# Patient Record
Sex: FEMALE | Race: WHITE | NOT HISPANIC OR LATINO | URBAN - METROPOLITAN AREA
[De-identification: names, ages, dates, MRNs, and addresses within clinical notes are randomized per-mention and may not be internally consistent; named-entity substitution may affect disease eponyms.]

---

## 2017-02-07 ENCOUNTER — INPATIENT (INPATIENT)
Facility: HOSPITAL | Age: 67
LOS: 3 days | Discharge: HOME HEALTH | DRG: 603 | End: 2017-02-11
Attending: INTERNAL MEDICINE | Admitting: INTERNAL MEDICINE
Payer: MEDICARE

## 2017-02-07 VITALS
DIASTOLIC BLOOD PRESSURE: 97 MMHG | TEMPERATURE: 98 F | WEIGHT: 130.07 LBS | RESPIRATION RATE: 20 BRPM | HEART RATE: 131 BPM | SYSTOLIC BLOOD PRESSURE: 141 MMHG | HEIGHT: 63 IN | OXYGEN SATURATION: 95 %

## 2017-02-07 DIAGNOSIS — L03.90 CELLULITIS, UNSPECIFIED: ICD-10-CM

## 2017-02-07 LAB
ACETONE SERPL-MCNC: ABNORMAL
ALBUMIN SERPL ELPH-MCNC: 3 G/DL — LOW (ref 3.5–5)
ALP SERPL-CCNC: 56 U/L — SIGNIFICANT CHANGE UP (ref 40–120)
ALT FLD-CCNC: 18 U/L DA — SIGNIFICANT CHANGE UP (ref 10–60)
ANION GAP SERPL CALC-SCNC: 9 MMOL/L — SIGNIFICANT CHANGE UP (ref 5–17)
APTT BLD: 31.5 SEC — SIGNIFICANT CHANGE UP (ref 27.5–37.4)
AST SERPL-CCNC: 14 U/L — SIGNIFICANT CHANGE UP (ref 10–40)
BASOPHILS # BLD AUTO: 0.1 K/UL — SIGNIFICANT CHANGE UP (ref 0–0.2)
BASOPHILS NFR BLD AUTO: 0.9 % — SIGNIFICANT CHANGE UP (ref 0–2)
BILIRUB SERPL-MCNC: 0.4 MG/DL — SIGNIFICANT CHANGE UP (ref 0.2–1.2)
BUN SERPL-MCNC: 18 MG/DL — SIGNIFICANT CHANGE UP (ref 7–18)
CALCIUM SERPL-MCNC: 9 MG/DL — SIGNIFICANT CHANGE UP (ref 8.4–10.5)
CHLORIDE SERPL-SCNC: 106 MMOL/L — SIGNIFICANT CHANGE UP (ref 96–108)
CO2 SERPL-SCNC: 25 MMOL/L — SIGNIFICANT CHANGE UP (ref 22–31)
CREAT SERPL-MCNC: 0.78 MG/DL — SIGNIFICANT CHANGE UP (ref 0.5–1.3)
EOSINOPHIL # BLD AUTO: 0.1 K/UL — SIGNIFICANT CHANGE UP (ref 0–0.5)
EOSINOPHIL NFR BLD AUTO: 1.4 % — SIGNIFICANT CHANGE UP (ref 0–6)
GLUCOSE SERPL-MCNC: 130 MG/DL — HIGH (ref 70–99)
HCT VFR BLD CALC: 41.6 % — SIGNIFICANT CHANGE UP (ref 34.5–45)
HGB BLD-MCNC: 13.1 G/DL — SIGNIFICANT CHANGE UP (ref 11.5–15.5)
INR BLD: 1.03 RATIO — SIGNIFICANT CHANGE UP (ref 0.88–1.16)
LACTATE SERPL-SCNC: 1.4 MMOL/L — SIGNIFICANT CHANGE UP (ref 0.7–2)
LYMPHOCYTES # BLD AUTO: 3.4 K/UL — HIGH (ref 1–3.3)
LYMPHOCYTES # BLD AUTO: 35.6 % — SIGNIFICANT CHANGE UP (ref 13–44)
MAGNESIUM SERPL-MCNC: 1.6 MG/DL — LOW (ref 1.8–2.4)
MCHC RBC-ENTMCNC: 28.9 PG — SIGNIFICANT CHANGE UP (ref 27–34)
MCHC RBC-ENTMCNC: 31.4 GM/DL — LOW (ref 32–36)
MCV RBC AUTO: 92 FL — SIGNIFICANT CHANGE UP (ref 80–100)
MONOCYTES # BLD AUTO: 1.1 K/UL — HIGH (ref 0–0.9)
MONOCYTES NFR BLD AUTO: 11.4 % — SIGNIFICANT CHANGE UP (ref 2–14)
NEUTROPHILS # BLD AUTO: 4.8 K/UL — SIGNIFICANT CHANGE UP (ref 1.8–7.4)
NEUTROPHILS NFR BLD AUTO: 50.6 % — SIGNIFICANT CHANGE UP (ref 43–77)
PLATELET # BLD AUTO: 274 K/UL — SIGNIFICANT CHANGE UP (ref 150–400)
POTASSIUM SERPL-MCNC: 3.8 MMOL/L — SIGNIFICANT CHANGE UP (ref 3.5–5.3)
POTASSIUM SERPL-SCNC: 3.8 MMOL/L — SIGNIFICANT CHANGE UP (ref 3.5–5.3)
PROT SERPL-MCNC: 7.3 G/DL — SIGNIFICANT CHANGE UP (ref 6–8.3)
PROTHROM AB SERPL-ACNC: 11.5 SEC — SIGNIFICANT CHANGE UP (ref 10–13.1)
RBC # BLD: 4.52 M/UL — SIGNIFICANT CHANGE UP (ref 3.8–5.2)
RBC # FLD: 14.7 % — HIGH (ref 10.3–14.5)
SODIUM SERPL-SCNC: 140 MMOL/L — SIGNIFICANT CHANGE UP (ref 135–145)
WBC # BLD: 9.5 K/UL — SIGNIFICANT CHANGE UP (ref 3.8–10.5)
WBC # FLD AUTO: 9.5 K/UL — SIGNIFICANT CHANGE UP (ref 3.8–10.5)

## 2017-02-07 PROCEDURE — 71010: CPT | Mod: 26

## 2017-02-07 PROCEDURE — 99285 EMERGENCY DEPT VISIT HI MDM: CPT

## 2017-02-07 PROCEDURE — 73110 X-RAY EXAM OF WRIST: CPT | Mod: 26,RT

## 2017-02-07 PROCEDURE — 73130 X-RAY EXAM OF HAND: CPT | Mod: 26,RT

## 2017-02-07 RX ORDER — SODIUM CHLORIDE 9 MG/ML
1000 INJECTION INTRAMUSCULAR; INTRAVENOUS; SUBCUTANEOUS
Qty: 0 | Refills: 0 | Status: DISCONTINUED | OUTPATIENT
Start: 2017-02-07 | End: 2017-02-08

## 2017-02-07 RX ORDER — PIPERACILLIN AND TAZOBACTAM 4; .5 G/20ML; G/20ML
3.38 INJECTION, POWDER, LYOPHILIZED, FOR SOLUTION INTRAVENOUS ONCE
Qty: 0 | Refills: 0 | Status: COMPLETED | OUTPATIENT
Start: 2017-02-07 | End: 2017-02-07

## 2017-02-07 RX ORDER — KETOROLAC TROMETHAMINE 30 MG/ML
30 SYRINGE (ML) INJECTION ONCE
Qty: 0 | Refills: 0 | Status: DISCONTINUED | OUTPATIENT
Start: 2017-02-07 | End: 2017-02-07

## 2017-02-07 RX ORDER — MAGNESIUM SULFATE 500 MG/ML
2 VIAL (ML) INJECTION ONCE
Qty: 0 | Refills: 0 | Status: COMPLETED | OUTPATIENT
Start: 2017-02-07 | End: 2017-02-07

## 2017-02-07 RX ADMIN — Medication 50 GRAM(S): at 22:44

## 2017-02-07 RX ADMIN — Medication 30 MILLIGRAM(S): at 19:29

## 2017-02-07 RX ADMIN — Medication 30 MILLIGRAM(S): at 18:14

## 2017-02-07 RX ADMIN — PIPERACILLIN AND TAZOBACTAM 200 GRAM(S): 4; .5 INJECTION, POWDER, LYOPHILIZED, FOR SOLUTION INTRAVENOUS at 18:14

## 2017-02-07 NOTE — ED PROVIDER NOTE - OBJECTIVE STATEMENT
65 y/o F pt w/ PMHx of Alzheimer disease, NIDDM (last dose Metformin 0930 today) , HLD, and HTN and PSHx of hernia surgery and  was BIB daughter and HHA to the ED for R hand swelling and redness today, as per daughter. The pt's HHA reports finding the pt in bed this morning w/ her R hand dangling between the bed and the wall. The pt's daughter denies fall or trauma.  Pt's daughter states that the pt is nonverbal at baseline secondary to Alzheimers. Pt's daughter denies fever, vomiting, diarrhea, blood in stool, hematuria, rash, or any other complaints. PMD: Dr. Carol Bay. [place]

## 2017-02-07 NOTE — ED PROVIDER NOTE - MEDICAL DECISION MAKING DETAILS
65 y/o F pt w/ Alzheimer's appears to have cellulitis of R hand. Will get Xray, give analgesics and Abx. Admission.

## 2017-02-07 NOTE — ED PROVIDER NOTE - CARE PLAN
Principal Discharge DX:	Cellulitis  Secondary Diagnosis:	Lymphangitis  Secondary Diagnosis:	Dehydration

## 2017-02-07 NOTE — ED PROVIDER NOTE - NS ED MD SCRIBE ATTENDING SCRIBE SECTIONS
PHYSICAL EXAM/REVIEW OF SYSTEMS/VITAL SIGNS( Pullset)/PAST MEDICAL/SURGICAL/SOCIAL HISTORY/DISPOSITION/HISTORY OF PRESENT ILLNESS

## 2017-02-07 NOTE — ED ADULT NURSE NOTE - OBJECTIVE STATEMENT
Presented to ED with complaints of "both hands swelling when patient woke up today." Patient is awake, nonverbal. Breathing easy on room air.

## 2017-02-07 NOTE — ED ADULT NURSE REASSESSMENT NOTE - NS ED NURSE REASSESS COMMENT FT1
received from ED pt awake, not in distress, speech not clear to understand, right forearm with swelling and redness, pt admitted for cellulitis, will cont. care. received from ED pt awake, not in distress, speech not clear to understand, HHA states pt  with advanced dementia ,right forearm with swelling and redness, pt admitted for cellulitis, will cont. care.IV site right AC g20 no swelling noted.

## 2017-02-07 NOTE — ED PROVIDER NOTE - UPPER EXTREMITY EXAM, RIGHT
R hand swelling, R index finger w/ erythema, able to flex fingers, tenderness to palpation to dorsal aspect of hand, redness tracking dorsally up to elbow, warm to touch

## 2017-02-08 DIAGNOSIS — G30.9 ALZHEIMER'S DISEASE, UNSPECIFIED: ICD-10-CM

## 2017-02-08 DIAGNOSIS — L03.90 CELLULITIS, UNSPECIFIED: ICD-10-CM

## 2017-02-08 DIAGNOSIS — I10 ESSENTIAL (PRIMARY) HYPERTENSION: ICD-10-CM

## 2017-02-08 DIAGNOSIS — Z41.8 ENCOUNTER FOR OTHER PROCEDURES FOR PURPOSES OTHER THAN REMEDYING HEALTH STATE: ICD-10-CM

## 2017-02-08 DIAGNOSIS — E78.5 HYPERLIPIDEMIA, UNSPECIFIED: ICD-10-CM

## 2017-02-08 DIAGNOSIS — R56.9 UNSPECIFIED CONVULSIONS: ICD-10-CM

## 2017-02-08 DIAGNOSIS — E11.9 TYPE 2 DIABETES MELLITUS WITHOUT COMPLICATIONS: ICD-10-CM

## 2017-02-08 DIAGNOSIS — S69.90XA UNSPECIFIED INJURY OF UNSPECIFIED WRIST, HAND AND FINGER(S), INITIAL ENCOUNTER: ICD-10-CM

## 2017-02-08 LAB
ANION GAP SERPL CALC-SCNC: 9 MMOL/L — SIGNIFICANT CHANGE UP (ref 5–17)
ANTI-RIBONUCLEAR PROTEIN: <0.2 AI — SIGNIFICANT CHANGE UP
BUN SERPL-MCNC: 15 MG/DL — SIGNIFICANT CHANGE UP (ref 7–18)
CALCIUM SERPL-MCNC: 8.6 MG/DL — SIGNIFICANT CHANGE UP (ref 8.4–10.5)
CHLORIDE SERPL-SCNC: 109 MMOL/L — HIGH (ref 96–108)
CHOLEST SERPL-MCNC: 222 MG/DL — HIGH (ref 10–199)
CK SERPL-CCNC: 167 U/L — SIGNIFICANT CHANGE UP (ref 21–215)
CO2 SERPL-SCNC: 27 MMOL/L — SIGNIFICANT CHANGE UP (ref 22–31)
CREAT SERPL-MCNC: 0.77 MG/DL — SIGNIFICANT CHANGE UP (ref 0.5–1.3)
CRP SERPL-MCNC: 0.78 MG/DL — HIGH (ref 0–0.4)
DSDNA AB FLD-ACNC: <0.2 AI — SIGNIFICANT CHANGE UP
ENA SS-A AB FLD IA-ACNC: <0.2 AI — SIGNIFICANT CHANGE UP
ERYTHROCYTE [SEDIMENTATION RATE] IN BLOOD: 28 MM/HR — HIGH (ref 0–20)
FOLATE SERPL-MCNC: 17.8 NG/ML — SIGNIFICANT CHANGE UP (ref 4.8–24.2)
GLUCOSE SERPL-MCNC: 138 MG/DL — HIGH (ref 70–99)
HBA1C BLD-MCNC: 6.5 % — HIGH (ref 4–5.6)
HCT VFR BLD CALC: 40.4 % — SIGNIFICANT CHANGE UP (ref 34.5–45)
HDLC SERPL-MCNC: 46 MG/DL — SIGNIFICANT CHANGE UP (ref 40–125)
HGB BLD-MCNC: 13 G/DL — SIGNIFICANT CHANGE UP (ref 11.5–15.5)
LACTATE SERPL-SCNC: 1.4 MMOL/L — SIGNIFICANT CHANGE UP (ref 0.7–2)
LIPID PNL WITH DIRECT LDL SERPL: 139 MG/DL — SIGNIFICANT CHANGE UP
MAGNESIUM SERPL-MCNC: 2.4 MG/DL — SIGNIFICANT CHANGE UP (ref 1.8–2.4)
MCHC RBC-ENTMCNC: 30 PG — SIGNIFICANT CHANGE UP (ref 27–34)
MCHC RBC-ENTMCNC: 32.2 GM/DL — SIGNIFICANT CHANGE UP (ref 32–36)
MCV RBC AUTO: 93.1 FL — SIGNIFICANT CHANGE UP (ref 80–100)
PHOSPHATE SERPL-MCNC: 3.2 MG/DL — SIGNIFICANT CHANGE UP (ref 2.5–4.5)
PLATELET # BLD AUTO: 258 K/UL — SIGNIFICANT CHANGE UP (ref 150–400)
POTASSIUM SERPL-MCNC: 4.2 MMOL/L — SIGNIFICANT CHANGE UP (ref 3.5–5.3)
POTASSIUM SERPL-SCNC: 4.2 MMOL/L — SIGNIFICANT CHANGE UP (ref 3.5–5.3)
RBC # BLD: 4.34 M/UL — SIGNIFICANT CHANGE UP (ref 3.8–5.2)
RBC # FLD: 14.9 % — HIGH (ref 10.3–14.5)
SODIUM SERPL-SCNC: 145 MMOL/L — SIGNIFICANT CHANGE UP (ref 135–145)
TOTAL CHOLESTEROL/HDL RATIO MEASUREMENT: 4.8 RATIO — SIGNIFICANT CHANGE UP (ref 3.3–7.1)
TRIGL SERPL-MCNC: 187 MG/DL — HIGH (ref 10–149)
TSH SERPL-MCNC: 0.8 UU/ML — SIGNIFICANT CHANGE UP (ref 0.34–4.82)
VIT B12 SERPL-MCNC: 711 PG/ML — SIGNIFICANT CHANGE UP (ref 243–894)
WBC # BLD: 8 K/UL — SIGNIFICANT CHANGE UP (ref 3.8–10.5)
WBC # FLD AUTO: 8 K/UL — SIGNIFICANT CHANGE UP (ref 3.8–10.5)

## 2017-02-08 PROCEDURE — 99223 1ST HOSP IP/OBS HIGH 75: CPT | Mod: AI,GC

## 2017-02-08 RX ORDER — ENOXAPARIN SODIUM 100 MG/ML
40 INJECTION SUBCUTANEOUS DAILY
Qty: 0 | Refills: 0 | Status: DISCONTINUED | OUTPATIENT
Start: 2017-02-08 | End: 2017-02-11

## 2017-02-08 RX ORDER — AMLODIPINE BESYLATE 2.5 MG/1
5 TABLET ORAL DAILY
Qty: 0 | Refills: 0 | Status: DISCONTINUED | OUTPATIENT
Start: 2017-02-08 | End: 2017-02-11

## 2017-02-08 RX ORDER — SODIUM CHLORIDE 9 MG/ML
1000 INJECTION, SOLUTION INTRAVENOUS
Qty: 0 | Refills: 0 | Status: DISCONTINUED | OUTPATIENT
Start: 2017-02-08 | End: 2017-02-11

## 2017-02-08 RX ORDER — MULTIVIT-MIN/FERROUS GLUCONATE 9 MG/15 ML
1 LIQUID (ML) ORAL DAILY
Qty: 0 | Refills: 0 | Status: DISCONTINUED | OUTPATIENT
Start: 2017-02-08 | End: 2017-02-11

## 2017-02-08 RX ORDER — MEMANTINE HYDROCHLORIDE 10 MG/1
10 TABLET ORAL
Qty: 0 | Refills: 0 | Status: DISCONTINUED | OUTPATIENT
Start: 2017-02-08 | End: 2017-02-11

## 2017-02-08 RX ORDER — VALPROIC ACID (AS SODIUM SALT) 250 MG/5ML
500 SOLUTION, ORAL ORAL
Qty: 0 | Refills: 0 | Status: DISCONTINUED | OUTPATIENT
Start: 2017-02-08 | End: 2017-02-08

## 2017-02-08 RX ORDER — DEXTROSE 50 % IN WATER 50 %
12.5 SYRINGE (ML) INTRAVENOUS ONCE
Qty: 0 | Refills: 0 | Status: DISCONTINUED | OUTPATIENT
Start: 2017-02-08 | End: 2017-02-11

## 2017-02-08 RX ORDER — PIOGLITAZONE HYDROCHLORIDE 15 MG/1
15 TABLET ORAL DAILY
Qty: 0 | Refills: 0 | Status: DISCONTINUED | OUTPATIENT
Start: 2017-02-08 | End: 2017-02-11

## 2017-02-08 RX ORDER — GLUCAGON INJECTION, SOLUTION 0.5 MG/.1ML
1 INJECTION, SOLUTION SUBCUTANEOUS ONCE
Qty: 0 | Refills: 0 | Status: DISCONTINUED | OUTPATIENT
Start: 2017-02-08 | End: 2017-02-11

## 2017-02-08 RX ORDER — INSULIN LISPRO 100/ML
VIAL (ML) SUBCUTANEOUS
Qty: 0 | Refills: 0 | Status: DISCONTINUED | OUTPATIENT
Start: 2017-02-08 | End: 2017-02-11

## 2017-02-08 RX ORDER — DIVALPROEX SODIUM 500 MG/1
500 TABLET, DELAYED RELEASE ORAL
Qty: 0 | Refills: 0 | Status: DISCONTINUED | OUTPATIENT
Start: 2017-02-08 | End: 2017-02-09

## 2017-02-08 RX ORDER — AMPICILLIN SODIUM AND SULBACTAM SODIUM 250; 125 MG/ML; MG/ML
3 INJECTION, POWDER, FOR SUSPENSION INTRAMUSCULAR; INTRAVENOUS ONCE
Qty: 0 | Refills: 0 | Status: COMPLETED | OUTPATIENT
Start: 2017-02-08 | End: 2017-02-08

## 2017-02-08 RX ORDER — METFORMIN HYDROCHLORIDE 850 MG/1
500 TABLET ORAL
Qty: 0 | Refills: 0 | Status: DISCONTINUED | OUTPATIENT
Start: 2017-02-08 | End: 2017-02-09

## 2017-02-08 RX ORDER — DEXTROSE 50 % IN WATER 50 %
1 SYRINGE (ML) INTRAVENOUS ONCE
Qty: 0 | Refills: 0 | Status: DISCONTINUED | OUTPATIENT
Start: 2017-02-08 | End: 2017-02-11

## 2017-02-08 RX ORDER — AMPICILLIN SODIUM AND SULBACTAM SODIUM 250; 125 MG/ML; MG/ML
3 INJECTION, POWDER, FOR SUSPENSION INTRAMUSCULAR; INTRAVENOUS EVERY 6 HOURS
Qty: 0 | Refills: 0 | Status: DISCONTINUED | OUTPATIENT
Start: 2017-02-08 | End: 2017-02-10

## 2017-02-08 RX ORDER — PANTOPRAZOLE SODIUM 20 MG/1
40 TABLET, DELAYED RELEASE ORAL
Qty: 0 | Refills: 0 | Status: DISCONTINUED | OUTPATIENT
Start: 2017-02-08 | End: 2017-02-11

## 2017-02-08 RX ORDER — AMPICILLIN SODIUM AND SULBACTAM SODIUM 250; 125 MG/ML; MG/ML
INJECTION, POWDER, FOR SUSPENSION INTRAMUSCULAR; INTRAVENOUS
Qty: 0 | Refills: 0 | Status: DISCONTINUED | OUTPATIENT
Start: 2017-02-08 | End: 2017-02-10

## 2017-02-08 RX ORDER — DEXTROSE 50 % IN WATER 50 %
25 SYRINGE (ML) INTRAVENOUS ONCE
Qty: 0 | Refills: 0 | Status: DISCONTINUED | OUTPATIENT
Start: 2017-02-08 | End: 2017-02-11

## 2017-02-08 RX ORDER — MULTIVIT WITH MIN/MFOLATE/K2 340-15/3 G
300 POWDER (GRAM) ORAL
Qty: 0 | Refills: 0 | Status: DISCONTINUED | OUTPATIENT
Start: 2017-02-08 | End: 2017-02-11

## 2017-02-08 RX ORDER — ASPIRIN/CALCIUM CARB/MAGNESIUM 324 MG
81 TABLET ORAL DAILY
Qty: 0 | Refills: 0 | Status: DISCONTINUED | OUTPATIENT
Start: 2017-02-08 | End: 2017-02-11

## 2017-02-08 RX ORDER — ACETAMINOPHEN 500 MG
650 TABLET ORAL EVERY 6 HOURS
Qty: 0 | Refills: 0 | Status: DISCONTINUED | OUTPATIENT
Start: 2017-02-08 | End: 2017-02-11

## 2017-02-08 RX ADMIN — AMPICILLIN SODIUM AND SULBACTAM SODIUM 200 GRAM(S): 250; 125 INJECTION, POWDER, FOR SUSPENSION INTRAMUSCULAR; INTRAVENOUS at 13:45

## 2017-02-08 RX ADMIN — Medication 81 MILLIGRAM(S): at 12:27

## 2017-02-08 RX ADMIN — SODIUM CHLORIDE 150 MILLILITER(S): 9 INJECTION INTRAMUSCULAR; INTRAVENOUS; SUBCUTANEOUS at 00:32

## 2017-02-08 RX ADMIN — Medication 2: at 12:27

## 2017-02-08 RX ADMIN — MEMANTINE HYDROCHLORIDE 10 MILLIGRAM(S): 10 TABLET ORAL at 06:19

## 2017-02-08 RX ADMIN — DIVALPROEX SODIUM 500 MILLIGRAM(S): 500 TABLET, DELAYED RELEASE ORAL at 06:19

## 2017-02-08 RX ADMIN — DIVALPROEX SODIUM 500 MILLIGRAM(S): 500 TABLET, DELAYED RELEASE ORAL at 17:44

## 2017-02-08 RX ADMIN — ENOXAPARIN SODIUM 40 MILLIGRAM(S): 100 INJECTION SUBCUTANEOUS at 12:27

## 2017-02-08 RX ADMIN — METFORMIN HYDROCHLORIDE 500 MILLIGRAM(S): 850 TABLET ORAL at 09:06

## 2017-02-08 RX ADMIN — AMPICILLIN SODIUM AND SULBACTAM SODIUM 200 GRAM(S): 250; 125 INJECTION, POWDER, FOR SUSPENSION INTRAMUSCULAR; INTRAVENOUS at 17:44

## 2017-02-08 RX ADMIN — SODIUM CHLORIDE 150 MILLILITER(S): 9 INJECTION INTRAMUSCULAR; INTRAVENOUS; SUBCUTANEOUS at 06:32

## 2017-02-08 RX ADMIN — AMLODIPINE BESYLATE 5 MILLIGRAM(S): 2.5 TABLET ORAL at 06:19

## 2017-02-08 RX ADMIN — PIOGLITAZONE HYDROCHLORIDE 15 MILLIGRAM(S): 15 TABLET ORAL at 12:27

## 2017-02-08 RX ADMIN — Medication 1 TABLET(S): at 12:27

## 2017-02-08 RX ADMIN — MEMANTINE HYDROCHLORIDE 10 MILLIGRAM(S): 10 TABLET ORAL at 17:44

## 2017-02-08 RX ADMIN — METFORMIN HYDROCHLORIDE 500 MILLIGRAM(S): 850 TABLET ORAL at 17:44

## 2017-02-08 RX ADMIN — AMPICILLIN SODIUM AND SULBACTAM SODIUM 200 GRAM(S): 250; 125 INJECTION, POWDER, FOR SUSPENSION INTRAMUSCULAR; INTRAVENOUS at 09:06

## 2017-02-08 NOTE — PHYSICAL THERAPY INITIAL EVALUATION ADULT - RANGE OF MOTION EXAMINATION, REHAB EVAL
Left LE ROM was WFL (within functional limits)/Left UE ROM was WFL (within functional limits)/Right LE ROM was WFL (within functional limits)/Right UE ROM was WFL (within functional limits)

## 2017-02-08 NOTE — H&P ADULT. - RS GEN PE MLT RESP DETAILS PC
respirations non-labored/breath sounds equal/good air movement/clear to auscultation bilaterally/airway patent/normal

## 2017-02-08 NOTE — H&P ADULT. - ASSESSMENT
67 y/o F pt w/ PMHx of Alzheimer disease, NIDDM (last dose Metformin 0930 today) , HLD, and HTN and PSHx of hernia surgery and  was BIB daughter and HHA to the ED for R hand swelling and redness today. 67 y/o F pt w/ PMHx of Alzheimer disease, seizure, osteoporosis, NIDDM (last dose Metformin 09 today) , HLD, and HTN and PSHx of hernia surgery and  was BIB daughter and HHA to the ED for R hand swelling and redness today.

## 2017-02-08 NOTE — PHYSICAL THERAPY INITIAL EVALUATION ADULT - FOLLOWS COMMANDS/ANSWERS QUESTIONS, REHAB EVAL
50% of the time/speech unintelligible/unable to answer questions/requires guidance and verbal/manual cues

## 2017-02-08 NOTE — H&P ADULT. - HISTORY OF PRESENT ILLNESS
65 y/o F pt w/ PMHx of Alzheimer disease, NIDDM (last dose Metformin 0930 today) , HLD, and HTN and PSHx of hernia surgery and  was BIB daughter and HHA to the ED for R hand swelling and redness today, as per daughter. The pt's HHA reports finding the pt in bed this morning w/ her R hand dangling between the bed and the wall. The pt's daughter denies fall or trauma.  Pt's daughter states that the pt is nonverbal at baseline secondary to Alzheimers. Pt's daughter denies fever, vomiting, diarrhea, blood in stool, hematuria, rash, or any other complaints. PMD: Dr. Carol Bay. 65 y/o F pt w/ PMHx of Alzheimer disease, NIDDM (last dose Metformin 0930 today) , HLD, and HTN and PSHx of hernia surgery and  was BIB daughter and HHA to the ED for R hand swelling and redness today. Pt can only mumble and can not answer question and do not follow command and history obtained from daughter and friend. The pt's HHA reports finding the pt in bed this morning w/ her R hand dangling between the bed and the wall. At baseline, pt can not take care of herself at all and need to be prompted to sit or walk. The pt's daughter denies fever, chills, ulcer or wound in the area before, fall or trauma.     In ED, pt vitals were stable. 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  And mild radiocarpal and lateral distal carpal osteoarthritis. There is no fracture or acute malalignment or focal osseous destruction or demineralization 67 y/o F pt walk independently when prompted with  HHA w/ PMHx of Alzheimer disease,  seizure, osteoporosis, NIDDM (last dose Metformin 0930 today) , HLD, and HTN and PSHx of hernia surgery and  was BIB daughter and HHA to the ED for R hand swelling and redness today. Pt can only mumble and can not answer question and do not follow command and history obtained from daughter and friend. The pt's HHA reports finding the pt in bed this morning w/ her R hand dangling between the bed and the wall. At baseline, pt can not take care of herself at all and need to be prompted to sit or walk. The pt's daughter denies fever, chills, ulcer or wound in the area before, fall or trauma.     In ED, pt vitals were stable. 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  And mild radiocarpal and lateral distal carpal osteoarthritis. There is no fracture or acute malalignment or focal osseous destruction or demineralization 67 y/o F pt walk independently when prompted with  HHA w/ PMHx of Alzheimer disease,  seizure, osteoporosis, NIDDM (last dose Metformin 0930 today) , HLD, and HTN and PSHx of hernia surgery and  was BIB daughter and HHA to the ED for R hand swelling and redness today. Pt can only mumble and can not answer question and do not follow command and history obtained from daughter and friend. The pt's HHA reports finding the pt in bed this morning w/ her R hand dangling between the bed and the wall. At baseline, pt can not take care of herself at all and need to be prompted to sit or walk. The pt's daughter denies fever, chills, ulcer or wound in the area before, fall or trauma.     In ED, pt vitals were stable. 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  And mild radiocarpal and lateral distal carpal osteoarthritis. There is no fracture or acute malalignment or focal osseous destruction or demineralization  On PE, moderate tenderness with bruises on fingers. No ulcer or wound.

## 2017-02-08 NOTE — PHYSICAL THERAPY INITIAL EVALUATION ADULT - MANUAL MUSCLE TESTING RESULTS, REHAB EVAL
unable to assess MMT secondary to pt. cognitive impairment, B UE and B LE at least 3-/5 as observed through performance of functional activities

## 2017-02-08 NOTE — PHYSICAL THERAPY INITIAL EVALUATION ADULT - GENERAL OBSERVATIONS, REHAB EVAL
Consult received, chart reviewed. Patient received supine in bed, NAD, A+O x 0. Patient agreed to EVALUATION from Physical Therapist.

## 2017-02-08 NOTE — PHYSICAL THERAPY INITIAL EVALUATION ADULT - LEVEL OF INDEPENDENCE: STAIR NEGOTIATION, REHAB EVAL
stairs not assessed at this time, will follow up with family if pt. requires stairs to enter home environment

## 2017-02-08 NOTE — H&P ADULT. - PROBLEM SELECTOR PLAN 1
Hand XR with only IP arthritis and no acute injury with mild tenderness and bruises on PE.  NO WBC, no ulcer or site of entry wound, no temp. Low suspicion for cellulitis. No ABx needed.  Pain management. PT consult. Hand XR with only IP arthritis and no acute injury with mild tenderness and bruises on PE. NO WBC, no ulcer or site of entry wound, no temp.   c/w Unasyn for now. f/u BCx, lactate.  Pain management. PT consult. Hand XR with only IP arthritis and no acute injury with mild tenderness and bruises on PE. NO WBC, no ulcer or site of entry wound, no temp.   c/w Unasyn for now. f/u BCx, lactate.  Pain management.

## 2017-02-08 NOTE — H&P ADULT. - ATTENDING COMMENTS
patient seen and examined this morning; Spoke with PGY 1 MAR Dr. Potts earlier today.    67 y/o F pt walk independently when prompted with  HHA w/ PMHx of Alzheimer disease,  seizure, osteoporosis, NIDDM (last dose Metformin 0930 today) , HLD, and HTN and PSHx of hernia surgery and  was BIB daughter and HHA to the ED for R hand swelling and redness today. Pt can only mumble and can not answer question and do not follow command and history obtained from daughter and friend. The pt's HHA reports finding the pt in bed this morning w/ her R hand dangling between the bed and the wall. At baseline, pt can not take care of herself at all and need to be prompted to sit or walk. The pt's daughter denies fever, chills, ulcer or wound in the area before, fall or trauma.       In ED, pt vitals were stable. 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  And mild radiocarpal and lateral distal carpal osteoarthritis. There is no fracture or acute malalignment or focal osseous destruction or demineralization    On PE, moderate tenderness with bruises on fingers. No ulcer or wound.   There is cyanosis of the hand and warmth;    Plan  Admit to medicine; Unasyn IV  vasculitis/ Connective tissue disease w/u: ESR, CRP, PK, ANCA, Lupus a/c  CT of Rt. UE; If abnormal will get hand surgery patient seen and examined this morning; Spoke with PGY 1 MAR Dr. Potts earlier today.    65 y/o F pt walk independently when prompted with  HHA w/ PMHx of Alzheimer disease,  seizure, osteoporosis, NIDDM (last dose Metformin 0930 today) , HLD, and HTN and PSHx of hernia surgery and  was BIB daughter and HHA to the ED for R hand swelling and redness today. Pt can only mumble and can not answer question and do not follow command and history obtained from daughter and friend. The pt's HHA reports finding the pt in bed this morning w/ her R hand dangling between the bed and the wall. At baseline, pt can not take care of herself at all and need to be prompted to sit or walk. The pt's daughter denies fever, chills, ulcer or wound in the area before, fall or trauma.       In ED, pt vitals were stable. 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  And mild radiocarpal and lateral distal carpal osteoarthritis. There is no fracture or acute malalignment or focal osseous destruction or demineralization    Vitals: 143/64  HR 66  Temp 98.8F RR 15  SaO2 98%RA    On PE, moderate tenderness with bruises on fingers. No ulcer or wound.   There is cyanosis of the hand and fingers especially second digit on palmar aspect and warmth;    labs: CBC, BP WNL;   CXR: No active chest disease  X ray right hand: Osteoarthritis of DIP jts    Plan  Admit to medicine; Unasyn IV; OOB to chair  vasculitis/ Connective tissue disease w/u: ESR, CRP, PK, ANCA, Lupus a/c  CT of Rt. UE; If abnormal will get hand surgery patient seen and examined this morning; Spoke with PGY 1 DAYNA Padilla earlier today.    65 y/o F pt walk independently when prompted with  HHA w/ PMHx of Alzheimer disease,  seizure, osteoporosis, NIDDM (last dose Metformin 0930 today) , HLD, and HTN and PSHx of hernia surgery and  was BIB daughter and HHA to the ED for R hand swelling and redness today. Pt can only mumble and can not answer question and do not follow command and history obtained from daughter and friend. The pt's HHA reports finding the pt in bed this morning w/ her R hand dangling between the bed and the wall. At baseline, pt can not take care of herself at all and need to be prompted to sit or walk. The pt's daughter denies fever, chills, ulcer or wound in the area before, fall or trauma.       In ED, pt vitals were stable. 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  And mild radiocarpal and lateral distal carpal osteoarthritis. There is no fracture or acute malalignment or focal osseous destruction or demineralization    Vitals: 143/64  HR 66  Temp 98.8F RR 15  SaO2 98%RA    On PE, moderate tenderness with bruises on fingers. No ulcer or wound.   There is cyanosis of the hand and fingers especially second digit on palmar aspect and warmth;    labs: CBC, BP WNL;  ;   CXR: No active chest disease  X ray right hand: Osteoarthritis of DIP jts    D/D:  Cellulitis versus Flare of Osteoarthritis/ Connective tissue disorder  Advanced Dementia    Plan  Admit to medicine; Unasyn IV; OOB to chair  vasculitis/ Connective tissue disease w/u: ESR, CRP, PK, ANCA, Lupus a/c  CT of Rt. UE; If abnormal will get hand surgery    d/w PGY1 DAYNA padilla earlier today;  d/w Floor PGY 1Dr. Juan/ Dr. mckinley PGY2.

## 2017-02-08 NOTE — H&P ADULT. - PROBLEM SELECTOR PLAN 3
continue metformin and pioglitazone home dose.   Accucheck. continue metformin and pioglitazone home dose.   Accucheck. HSS low scale.

## 2017-02-08 NOTE — PHYSICAL THERAPY INITIAL EVALUATION ADULT - LIVES WITH, PROFILE
Pt. unable to provide social history. As per H&P, at baseline, pt. was independent when prompted in ambulation and sitting. Pt. cannot take care of herself and has 24/7 HHA

## 2017-02-08 NOTE — PHYSICAL THERAPY INITIAL EVALUATION ADULT - CRITERIA FOR SKILLED THERAPEUTIC INTERVENTIONS
functional limitations in following categories/risk reduction/prevention/predicted duration of therapy intervention/rehab potential/therapy frequency/anticipated discharge recommendation/impairments found

## 2017-02-09 LAB
24R-OH-CALCIDIOL SERPL-MCNC: 12.3 NG/ML — LOW (ref 30–100)
ANA PAT FLD IF-IMP: ABNORMAL
ANA TITR SER: ABNORMAL
ANION GAP SERPL CALC-SCNC: 8 MMOL/L — SIGNIFICANT CHANGE UP (ref 5–17)
AUTO DIFF PNL BLD: NEGATIVE — SIGNIFICANT CHANGE UP
BUN SERPL-MCNC: 17 MG/DL — SIGNIFICANT CHANGE UP (ref 7–18)
C-ANCA SER-ACNC: NEGATIVE — SIGNIFICANT CHANGE UP
CALCIUM SERPL-MCNC: 8.1 MG/DL — LOW (ref 8.4–10.5)
CCP IGG SERPL-ACNC: <8 UNITS — SIGNIFICANT CHANGE UP (ref 0–19)
CHLORIDE SERPL-SCNC: 109 MMOL/L — HIGH (ref 96–108)
CO2 SERPL-SCNC: 28 MMOL/L — SIGNIFICANT CHANGE UP (ref 22–31)
CREAT SERPL-MCNC: 0.68 MG/DL — SIGNIFICANT CHANGE UP (ref 0.5–1.3)
DRVVT SCREEN TO CONFIRM RATIO: SIGNIFICANT CHANGE UP
DSDNA AB SER-ACNC: 47 IU/ML — HIGH
GLUCOSE SERPL-MCNC: 125 MG/DL — HIGH (ref 70–99)
LA NT DPL PPP QL: 27.9 SEC — SIGNIFICANT CHANGE UP
MAGNESIUM SERPL-MCNC: 2 MG/DL — SIGNIFICANT CHANGE UP (ref 1.8–2.4)
NORMALIZED SCT PPP-RTO: 1.03 RATIO — SIGNIFICANT CHANGE UP (ref 0–1.16)
NORMALIZED SCT PPP-RTO: SIGNIFICANT CHANGE UP
P-ANCA SER-ACNC: NEGATIVE — SIGNIFICANT CHANGE UP
POTASSIUM SERPL-MCNC: 3.4 MMOL/L — LOW (ref 3.5–5.3)
POTASSIUM SERPL-SCNC: 3.4 MMOL/L — LOW (ref 3.5–5.3)
RF+CCP IGG SER-IMP: NEGATIVE — SIGNIFICANT CHANGE UP
SODIUM SERPL-SCNC: 145 MMOL/L — SIGNIFICANT CHANGE UP (ref 135–145)

## 2017-02-09 PROCEDURE — 99232 SBSQ HOSP IP/OBS MODERATE 35: CPT | Mod: GC

## 2017-02-09 PROCEDURE — 73201 CT UPPER EXTREMITY W/DYE: CPT | Mod: 26,RT

## 2017-02-09 RX ORDER — ERGOCALCIFEROL 1.25 MG/1
50000 CAPSULE ORAL
Qty: 0 | Refills: 0 | Status: DISCONTINUED | OUTPATIENT
Start: 2017-02-09 | End: 2017-02-11

## 2017-02-09 RX ORDER — VALPROIC ACID (AS SODIUM SALT) 250 MG/5ML
500 SOLUTION, ORAL ORAL
Qty: 0 | Refills: 0 | Status: DISCONTINUED | OUTPATIENT
Start: 2017-02-09 | End: 2017-02-09

## 2017-02-09 RX ORDER — DIVALPROEX SODIUM 500 MG/1
500 TABLET, DELAYED RELEASE ORAL
Qty: 0 | Refills: 0 | Status: DISCONTINUED | OUTPATIENT
Start: 2017-02-09 | End: 2017-02-11

## 2017-02-09 RX ORDER — POTASSIUM CHLORIDE 20 MEQ
40 PACKET (EA) ORAL ONCE
Qty: 0 | Refills: 0 | Status: DISCONTINUED | OUTPATIENT
Start: 2017-02-09 | End: 2017-02-09

## 2017-02-09 RX ORDER — SODIUM CHLORIDE 9 MG/ML
1000 INJECTION, SOLUTION INTRAVENOUS
Qty: 0 | Refills: 0 | Status: DISCONTINUED | OUTPATIENT
Start: 2017-02-09 | End: 2017-02-11

## 2017-02-09 RX ORDER — POTASSIUM CHLORIDE 20 MEQ
40 PACKET (EA) ORAL ONCE
Qty: 0 | Refills: 0 | Status: COMPLETED | OUTPATIENT
Start: 2017-02-09 | End: 2017-02-09

## 2017-02-09 RX ORDER — SODIUM CHLORIDE 9 MG/ML
1000 INJECTION, SOLUTION INTRAVENOUS
Qty: 0 | Refills: 0 | Status: DISCONTINUED | OUTPATIENT
Start: 2017-02-09 | End: 2017-02-09

## 2017-02-09 RX ADMIN — Medication 40 MILLIEQUIVALENT(S): at 12:45

## 2017-02-09 RX ADMIN — ERGOCALCIFEROL 50000 UNIT(S): 1.25 CAPSULE ORAL at 17:24

## 2017-02-09 RX ADMIN — Medication 1 TABLET(S): at 12:45

## 2017-02-09 RX ADMIN — AMPICILLIN SODIUM AND SULBACTAM SODIUM 200 GRAM(S): 250; 125 INJECTION, POWDER, FOR SUSPENSION INTRAMUSCULAR; INTRAVENOUS at 12:45

## 2017-02-09 RX ADMIN — AMPICILLIN SODIUM AND SULBACTAM SODIUM 200 GRAM(S): 250; 125 INJECTION, POWDER, FOR SUSPENSION INTRAMUSCULAR; INTRAVENOUS at 00:50

## 2017-02-09 RX ADMIN — Medication 81 MILLIGRAM(S): at 12:45

## 2017-02-09 RX ADMIN — MEMANTINE HYDROCHLORIDE 10 MILLIGRAM(S): 10 TABLET ORAL at 17:24

## 2017-02-09 RX ADMIN — Medication 1 MILLIGRAM(S): at 13:24

## 2017-02-09 RX ADMIN — AMPICILLIN SODIUM AND SULBACTAM SODIUM 200 GRAM(S): 250; 125 INJECTION, POWDER, FOR SUSPENSION INTRAMUSCULAR; INTRAVENOUS at 05:40

## 2017-02-09 RX ADMIN — PIOGLITAZONE HYDROCHLORIDE 15 MILLIGRAM(S): 15 TABLET ORAL at 12:45

## 2017-02-09 RX ADMIN — METFORMIN HYDROCHLORIDE 500 MILLIGRAM(S): 850 TABLET ORAL at 08:29

## 2017-02-09 RX ADMIN — AMPICILLIN SODIUM AND SULBACTAM SODIUM 200 GRAM(S): 250; 125 INJECTION, POWDER, FOR SUSPENSION INTRAMUSCULAR; INTRAVENOUS at 17:24

## 2017-02-09 RX ADMIN — MEMANTINE HYDROCHLORIDE 10 MILLIGRAM(S): 10 TABLET ORAL at 05:38

## 2017-02-09 RX ADMIN — DIVALPROEX SODIUM 500 MILLIGRAM(S): 500 TABLET, DELAYED RELEASE ORAL at 05:38

## 2017-02-09 RX ADMIN — DIVALPROEX SODIUM 500 MILLIGRAM(S): 500 TABLET, DELAYED RELEASE ORAL at 17:24

## 2017-02-09 RX ADMIN — PANTOPRAZOLE SODIUM 40 MILLIGRAM(S): 20 TABLET, DELAYED RELEASE ORAL at 08:29

## 2017-02-09 RX ADMIN — ENOXAPARIN SODIUM 40 MILLIGRAM(S): 100 INJECTION SUBCUTANEOUS at 12:47

## 2017-02-09 RX ADMIN — AMLODIPINE BESYLATE 5 MILLIGRAM(S): 2.5 TABLET ORAL at 05:38

## 2017-02-09 RX ADMIN — SODIUM CHLORIDE 75 MILLILITER(S): 9 INJECTION, SOLUTION INTRAVENOUS at 22:40

## 2017-02-09 NOTE — SWALLOW BEDSIDE ASSESSMENT ADULT - MODE OF PRESENTATION
fed by clinician/spoon/x4 x4/spoon/fed by clinician x4/fed by clinician/spoon cup/spoon/straw/fed by clinician/x3

## 2017-02-09 NOTE — SWALLOW BEDSIDE ASSESSMENT ADULT - SWALLOW EVAL: RECOMMENDED FEEDING/EATING TECHNIQUES
maintain upright posture during/after eating for 30 mins/oral hygiene/check mouth frequently for oral residue/pocketing/position upright (90 degrees)/alternate food with liquid/no straws/small sips/bites

## 2017-02-09 NOTE — SWALLOW BEDSIDE ASSESSMENT ADULT - ORAL PHASE
Lingual stasis/base of tongue pumping Stasis in anterior sulcus/Delayed oral transit time/Lingual stasis/Stasis in lateral sulci Delayed oral transit time

## 2017-02-09 NOTE — SWALLOW BEDSIDE ASSESSMENT ADULT - SLP PERTINENT HISTORY OF CURRENT PROBLEM
65 y/o F pt walk independently when prompted with 24/7 HHA w/ PMHx of Alzheimer disease,  seizure, osteoporosis, NIDDM, , HLD, and HTN presented to the ED for R hand swelling and redness. Pt can only mumble and can not answer question and do not follow command and history obtained from daughter and friend. Per HHA in ED documentation, pt at baseline, pt can not take care of herself at all and need to be prompted to sit or walk.

## 2017-02-09 NOTE — SWALLOW BEDSIDE ASSESSMENT ADULT - ORAL PREPARATORY PHASE
Reduced oral grading Decreased mastication ability/Reduced oral grading Within functional limits Anterior loss of bolus

## 2017-02-09 NOTE — SWALLOW BEDSIDE ASSESSMENT ADULT - SWALLOW EVAL: DIAGNOSIS
Pt p/w with oral dysphagia characterized by bite reflex, base of tongue pumping, oral stasis, and decreased AP transport. Pharyngeal swallow timely with adequate hyolaryngeal elevation. No overt s/s of aspiration/penetration seen at this exam.

## 2017-02-09 NOTE — SWALLOW BEDSIDE ASSESSMENT ADULT - COMMENTS
Pt seen for bedside swallow evaluation. Pt nonverbal, Alzheimer's dementia, and AAOx0. HOB elevated to 45 degrees. Pt accepted all PO trials by mouth. Oral residue remained after liquid wash, Oral residue not cleared by lingual sweeps or liquid wash Pt does not achieve adequate labial seal/intraoral pressure for cup/straw drinking

## 2017-02-10 LAB
ALBUMIN SERPL ELPH-MCNC: 2.7 G/DL — LOW (ref 3.5–5)
ALP SERPL-CCNC: 52 U/L — SIGNIFICANT CHANGE UP (ref 40–120)
ALT FLD-CCNC: 20 U/L DA — SIGNIFICANT CHANGE UP (ref 10–60)
ANION GAP SERPL CALC-SCNC: 7 MMOL/L — SIGNIFICANT CHANGE UP (ref 5–17)
AST SERPL-CCNC: 11 U/L — SIGNIFICANT CHANGE UP (ref 10–40)
BILIRUB SERPL-MCNC: 0.4 MG/DL — SIGNIFICANT CHANGE UP (ref 0.2–1.2)
BUN SERPL-MCNC: 20 MG/DL — HIGH (ref 7–18)
CALCIUM SERPL-MCNC: 8.1 MG/DL — LOW (ref 8.4–10.5)
CHLORIDE SERPL-SCNC: 108 MMOL/L — SIGNIFICANT CHANGE UP (ref 96–108)
CO2 SERPL-SCNC: 28 MMOL/L — SIGNIFICANT CHANGE UP (ref 22–31)
CREAT SERPL-MCNC: 0.69 MG/DL — SIGNIFICANT CHANGE UP (ref 0.5–1.3)
GLUCOSE SERPL-MCNC: 109 MG/DL — HIGH (ref 70–99)
POTASSIUM SERPL-MCNC: 3.8 MMOL/L — SIGNIFICANT CHANGE UP (ref 3.5–5.3)
POTASSIUM SERPL-SCNC: 3.8 MMOL/L — SIGNIFICANT CHANGE UP (ref 3.5–5.3)
PROT SERPL-MCNC: 6.5 G/DL — SIGNIFICANT CHANGE UP (ref 6–8.3)
SODIUM SERPL-SCNC: 143 MMOL/L — SIGNIFICANT CHANGE UP (ref 135–145)

## 2017-02-10 PROCEDURE — 99233 SBSQ HOSP IP/OBS HIGH 50: CPT | Mod: GC

## 2017-02-10 RX ORDER — ACETAMINOPHEN WITH CODEINE 300MG-30MG
1 TABLET ORAL
Qty: 21 | Refills: 0 | OUTPATIENT
Start: 2017-02-10 | End: 2017-02-17

## 2017-02-10 RX ORDER — ERGOCALCIFEROL 1.25 MG/1
1 CAPSULE ORAL
Qty: 4 | Refills: 0 | OUTPATIENT
Start: 2017-02-10 | End: 2017-03-12

## 2017-02-10 RX ORDER — ACETAMINOPHEN 500 MG
2 TABLET ORAL
Qty: 0 | Refills: 0 | COMMUNITY
Start: 2017-02-10

## 2017-02-10 RX ORDER — ATORVASTATIN CALCIUM 80 MG/1
1 TABLET, FILM COATED ORAL
Qty: 30 | Refills: 0 | OUTPATIENT
Start: 2017-02-10 | End: 2017-03-12

## 2017-02-10 RX ORDER — AMLODIPINE BESYLATE 2.5 MG/1
1 TABLET ORAL
Qty: 30 | Refills: 0 | OUTPATIENT
Start: 2017-02-10 | End: 2017-03-12

## 2017-02-10 RX ORDER — AMLODIPINE BESYLATE 2.5 MG/1
5 TABLET ORAL ONCE
Qty: 0 | Refills: 0 | Status: COMPLETED | OUTPATIENT
Start: 2017-02-10 | End: 2017-02-10

## 2017-02-10 RX ORDER — ATORVASTATIN CALCIUM 80 MG/1
10 TABLET, FILM COATED ORAL AT BEDTIME
Qty: 0 | Refills: 0 | Status: DISCONTINUED | OUTPATIENT
Start: 2017-02-10 | End: 2017-02-10

## 2017-02-10 RX ORDER — AMLODIPINE BESYLATE 2.5 MG/1
2 TABLET ORAL
Qty: 60 | Refills: 0 | OUTPATIENT
Start: 2017-02-10 | End: 2017-03-12

## 2017-02-10 RX ORDER — ATORVASTATIN CALCIUM 80 MG/1
40 TABLET, FILM COATED ORAL AT BEDTIME
Qty: 0 | Refills: 0 | Status: DISCONTINUED | OUTPATIENT
Start: 2017-02-10 | End: 2017-02-11

## 2017-02-10 RX ADMIN — AMPICILLIN SODIUM AND SULBACTAM SODIUM 200 GRAM(S): 250; 125 INJECTION, POWDER, FOR SUSPENSION INTRAMUSCULAR; INTRAVENOUS at 06:23

## 2017-02-10 RX ADMIN — DIVALPROEX SODIUM 500 MILLIGRAM(S): 500 TABLET, DELAYED RELEASE ORAL at 06:23

## 2017-02-10 RX ADMIN — AMLODIPINE BESYLATE 5 MILLIGRAM(S): 2.5 TABLET ORAL at 12:13

## 2017-02-10 RX ADMIN — ATORVASTATIN CALCIUM 40 MILLIGRAM(S): 80 TABLET, FILM COATED ORAL at 21:24

## 2017-02-10 RX ADMIN — Medication 1 TABLET(S): at 12:13

## 2017-02-10 RX ADMIN — Medication 81 MILLIGRAM(S): at 12:13

## 2017-02-10 RX ADMIN — Medication 1: at 18:56

## 2017-02-10 RX ADMIN — Medication 1 TABLET(S): at 18:56

## 2017-02-10 RX ADMIN — AMPICILLIN SODIUM AND SULBACTAM SODIUM 200 GRAM(S): 250; 125 INJECTION, POWDER, FOR SUSPENSION INTRAMUSCULAR; INTRAVENOUS at 12:13

## 2017-02-10 RX ADMIN — SODIUM CHLORIDE 75 MILLILITER(S): 9 INJECTION, SOLUTION INTRAVENOUS at 04:53

## 2017-02-10 RX ADMIN — PIOGLITAZONE HYDROCHLORIDE 15 MILLIGRAM(S): 15 TABLET ORAL at 12:13

## 2017-02-10 RX ADMIN — MEMANTINE HYDROCHLORIDE 10 MILLIGRAM(S): 10 TABLET ORAL at 18:56

## 2017-02-10 RX ADMIN — PANTOPRAZOLE SODIUM 40 MILLIGRAM(S): 20 TABLET, DELAYED RELEASE ORAL at 06:23

## 2017-02-10 RX ADMIN — ENOXAPARIN SODIUM 40 MILLIGRAM(S): 100 INJECTION SUBCUTANEOUS at 12:23

## 2017-02-10 RX ADMIN — AMPICILLIN SODIUM AND SULBACTAM SODIUM 200 GRAM(S): 250; 125 INJECTION, POWDER, FOR SUSPENSION INTRAMUSCULAR; INTRAVENOUS at 00:42

## 2017-02-10 RX ADMIN — AMLODIPINE BESYLATE 5 MILLIGRAM(S): 2.5 TABLET ORAL at 06:23

## 2017-02-10 RX ADMIN — MEMANTINE HYDROCHLORIDE 10 MILLIGRAM(S): 10 TABLET ORAL at 06:23

## 2017-02-10 NOTE — DISCHARGE NOTE ADULT - MEDICATION SUMMARY - MEDICATIONS TO TAKE
I will START or STAY ON the medications listed below when I get home from the hospital:    aspirin 81 mg oral tablet, chewable  -- 1 tab(s) by mouth once a day  -- Indication: For Heart prophylaxis    acetaminophen 325 mg oral tablet  -- 2 tab(s) by mouth every 6 hours, As needed, Mild Pain (1 - 3)  -- Indication: For pain    Tylenol with Codeine #3 oral tablet  -- 1 tab(s) by mouth every 8 hours, As Needed MDD:Do not exceed more than 3 tabs in one day    DERRICK number [id number]  -- Caution federal law prohibits the transfer of this drug to any person other  than the person for whom it was prescribed.  May cause drowsiness.  Alcohol may intensify this effect.  Use care when operating dangerous machinery.  This product contains acetaminophen.  Do not use  with any other product containing acetaminophen to prevent possible liver damage.  Using more of this medication than prescribed may cause serious breathing problems.    -- Indication: For pain    valproic acid 125 mg oral delayed release capsule  -- 4 cap(s) by mouth 2 times a day  -- Indication: For Seizure    metformin 500 mg oral tablet  -- 1 tab(s) by mouth 2 times a day  -- Indication: For Diabetes    pioglitazone 15 mg oral tablet  -- 1 tab(s) by mouth once a day  -- Indication: For Diabetes    atorvastatin 40 mg oral tablet  -- 1 tab(s) by mouth once a day (at bedtime)  -- Indication: For Cholesterol    alendronate 70 mg oral tablet  -- 1 tab(s) by mouth once a week  -- Indication: For osteoporosis    amLODIPine 10 mg oral tablet  -- 1 tab(s) by mouth once a day  -- It is very important that you take or use this exactly as directed.  Do not skip doses or discontinue unless directed by your doctor.  Some non-prescription drugs may aggravate your condition.  Read all labels carefully.  If a warning appears, check with your doctor before taking.    -- Indication: For Hypertension    Colace 100 mg oral capsule  -- 2 cap(s) by mouth once a day (at bedtime)  -- Indication: For Constipation    magnesium citrate 1.745 g/30 mL oral liquid  -- 300 milliliter(s) by mouth 3 times a week, As Needed  -- Indication: For Supplement    Namenda 10 mg oral tablet  -- 1 tab(s) by mouth 2 times a day  -- Indication: For Alzheimer disease    amoxicillin-clavulanate 875 mg-125 mg oral tablet  -- 1 tab(s) by mouth 2 times a day  -- Indication: For Cellulitis    Multiple Vitamins with Minerals oral tablet  -- 1 tab(s) by mouth once a day  -- Indication: For Vitamin supplement    ergocalciferol 50,000 intl units (1.25 mg) oral capsule  -- 1 cap(s) by mouth once a week  -- Indication: For Vitamin D deficiency

## 2017-02-10 NOTE — DISCHARGE NOTE ADULT - MEDICATION SUMMARY - MEDICATIONS TO CHANGE
I will SWITCH the dose or number of times a day I take the medications listed below when I get home from the hospital:    amLODIPine 5 mg oral tablet  -- 1 tab(s) by mouth once a day

## 2017-02-10 NOTE — DISCHARGE NOTE ADULT - PLAN OF CARE
resolution of infection, pain and swelling Imaging showed skin and soft tissue swelling consistent with cellulitis (skin infection). You were treated with IV antibiotics and these antibiotics have been switched to PO. Continue to take augmentin twice a day. Follow-up with your primary care doctor within te next 5 days to have them reevaluate your infection and make sure that it continues to improve. slow progression of disease to control blood sugar levels You . Continue your home actos. Do not take metformin today as you had a CT scan with contrast done yesterday (and taking taking the metformin too soon after the contrast can harm the kidneys). Start taking the metformin. prevent seizures to control blood pressure Your blood pressure was high in the hospital. We increased your norvasc from 5mg each day to 10mg each day. Follow-up with your primary care doctor and have them readjust your blood pressure medication as needed. Continue to take your home valproic acid. Follow-up with your primary care doctor Continue your home namenda as prescribed. Follow-up with your primary care doctor. to control cholesterol levels Your cholesterol levels were high. You were started on lipitor. Follow-up on your primary care doctor and have them check your liver tests since you are on valproic acid for seizures as well. to control vitamin D levels and build stronger bones Your vitamin D was low therfore you were started on ergocalciferol. Follow-up with your primary care doctor and have your vitamin D levels rechecked in 1 month to determine if you should continue on ergocalciferol or be switched to a different medication such as calciferol or if you no longer need any medications for your vitamin D. Imaging showed skin and soft tissue swelling consistent with cellulitis (skin infection). You were treated with IV antibiotics and these antibiotics have been switched to PO. Continue to take augmentin twice a day. The swelling can also be from trauma. You have bruising of your fingers and hands which likely also contributed to your hand swelling. Follow-up with your primary care doctor within te next 5 days to have them reevaluate your hand and make sure that it continues to improve. Take tylenol or tylenol # 3 as prescribed for pain. You . Continue your home Actos. Do not take metformin today as you had a CT scan with contrast done yesterday (and taking the metformin too soon after the contrast can harm the kidneys). Start taking the metformin tomorrow morning. prevent breakthrough seizures Your blood pressure was high in the hospital. We increased your Norvasc from 5mg each day to 10mg each day. Follow-up with your primary care doctor and have them readjust your blood pressure medication as needed. Continue your home Namenda as prescribed. Follow-up with your primary care doctor. Your cholesterol levels were high and your risk of heart disease is high. You were started on Lipitor. Follow-up on your primary care doctor and have them check your liver tests since you are on valproic acid for seizures as well. Your vitamin D was low therefore you were started on ergocalciferol. Follow-up with your primary care doctor and have your vitamin D levels rechecked in 1 month to determine level. Once levels more than 30, your doctor may switch Vitamin D to 1000 units daily. Imaging showed skin and soft tissue swelling consistent with cellulitis (skin infection). You were treated with IV antibiotics and these antibiotics have been switched to oral route. Continue to take Augmentin twice a day. The swelling can also be from trauma. You have bruising of your fingers and hands which likely also contributed to your hand swelling. Follow-up with your primary care doctor within te next 5 days to have them reevaluate your hand and make sure that it continues to improve. Take tylenol or tylenol # 3 as prescribed for pain.

## 2017-02-10 NOTE — DISCHARGE NOTE ADULT - CARE PLAN
Principal Discharge DX:	Cellulitis  Goal:	resolution of infection, pain and swelling  Instructions for follow-up, activity and diet:	Imaging showed skin and soft tissue swelling consistent with cellulitis (skin infection). You were treated with IV antibiotics and these antibiotics have been switched to PO. Continue to take augmentin twice a day. Follow-up with your primary care doctor within te next 5 days to have them reevaluate your infection and make sure that it continues to improve.  Secondary Diagnosis:	Alzheimer disease  Goal:	slow progression of disease  Instructions for follow-up, activity and diet:	Continue your home namenda as prescribed. Follow-up with your primary care doctor.  Secondary Diagnosis:	DM (diabetes mellitus)  Goal:	to control blood sugar levels  Instructions for follow-up, activity and diet:	You . Continue your home actos. Do not take metformin today as you had a CT scan with contrast done yesterday (and taking taking the metformin too soon after the contrast can harm the kidneys). Start taking the metformin.  Secondary Diagnosis:	HLD (hyperlipidemia)  Goal:	to control cholesterol levels  Instructions for follow-up, activity and diet:	Your cholesterol levels were high. You were started on lipitor. Follow-up on your primary care doctor and have them check your liver tests since you are on valproic acid for seizures as well.  Secondary Diagnosis:	Seizure  Goal:	prevent seizures  Instructions for follow-up, activity and diet:	Continue to take your home valproic acid. Follow-up with your primary care doctor  Secondary Diagnosis:	HTN (hypertension)  Goal:	to control blood pressure  Instructions for follow-up, activity and diet:	Your blood pressure was high in the hospital. We increased your norvasc from 5mg each day to 10mg each day. Follow-up with your primary care doctor and have them readjust your blood pressure medication as needed. Principal Discharge DX:	Cellulitis  Goal:	resolution of infection, pain and swelling  Instructions for follow-up, activity and diet:	Imaging showed skin and soft tissue swelling consistent with cellulitis (skin infection). You were treated with IV antibiotics and these antibiotics have been switched to PO. Continue to take augmentin twice a day. Follow-up with your primary care doctor within te next 5 days to have them reevaluate your infection and make sure that it continues to improve.  Secondary Diagnosis:	Alzheimer disease  Goal:	slow progression of disease  Instructions for follow-up, activity and diet:	Continue your home namenda as prescribed. Follow-up with your primary care doctor.  Secondary Diagnosis:	DM (diabetes mellitus)  Goal:	to control blood sugar levels  Instructions for follow-up, activity and diet:	You . Continue your home actos. Do not take metformin today as you had a CT scan with contrast done yesterday (and taking taking the metformin too soon after the contrast can harm the kidneys). Start taking the metformin.  Secondary Diagnosis:	HLD (hyperlipidemia)  Goal:	to control cholesterol levels  Instructions for follow-up, activity and diet:	Your cholesterol levels were high. You were started on lipitor. Follow-up on your primary care doctor and have them check your liver tests since you are on valproic acid for seizures as well.  Secondary Diagnosis:	Seizure  Goal:	prevent seizures  Instructions for follow-up, activity and diet:	Continue to take your home valproic acid. Follow-up with your primary care doctor  Secondary Diagnosis:	HTN (hypertension)  Goal:	to control blood pressure  Instructions for follow-up, activity and diet:	Your blood pressure was high in the hospital. We increased your norvasc from 5mg each day to 10mg each day. Follow-up with your primary care doctor and have them readjust your blood pressure medication as needed.  Secondary Diagnosis:	Vitamin D deficiency  Goal:	to control vitamin D levels and build stronger bones  Instructions for follow-up, activity and diet:	Your vitamin D was low therfore you were started on ergocalciferol. Follow-up with your primary care doctor and have your vitamin D levels rechecked in 1 month to determine if you should continue on ergocalciferol or be switched to a different medication such as calciferol or if you no longer need any medications for your vitamin D. Principal Discharge DX:	Cellulitis  Goal:	resolution of infection, pain and swelling  Instructions for follow-up, activity and diet:	Imaging showed skin and soft tissue swelling consistent with cellulitis (skin infection). You were treated with IV antibiotics and these antibiotics have been switched to PO. Continue to take augmentin twice a day. The swelling can also be from trauma. You have bruising of your fingers and hands which likely also contributed to your hand swelling. Follow-up with your primary care doctor within te next 5 days to have them reevaluate your hand and make sure that it continues to improve. Take tylenol or tylenol # 3 as prescribed for pain.  Secondary Diagnosis:	Alzheimer disease  Goal:	slow progression of disease  Instructions for follow-up, activity and diet:	Continue your home namenda as prescribed. Follow-up with your primary care doctor.  Secondary Diagnosis:	DM (diabetes mellitus)  Goal:	to control blood sugar levels  Instructions for follow-up, activity and diet:	You . Continue your home actos. Do not take metformin today as you had a CT scan with contrast done yesterday (and taking taking the metformin too soon after the contrast can harm the kidneys). Start taking the metformin.  Secondary Diagnosis:	HLD (hyperlipidemia)  Goal:	to control cholesterol levels  Instructions for follow-up, activity and diet:	Your cholesterol levels were high. You were started on lipitor. Follow-up on your primary care doctor and have them check your liver tests since you are on valproic acid for seizures as well.  Secondary Diagnosis:	Seizure  Goal:	prevent seizures  Instructions for follow-up, activity and diet:	Continue to take your home valproic acid. Follow-up with your primary care doctor  Secondary Diagnosis:	HTN (hypertension)  Goal:	to control blood pressure  Instructions for follow-up, activity and diet:	Your blood pressure was high in the hospital. We increased your norvasc from 5mg each day to 10mg each day. Follow-up with your primary care doctor and have them readjust your blood pressure medication as needed.  Secondary Diagnosis:	Vitamin D deficiency  Goal:	to control vitamin D levels and build stronger bones  Instructions for follow-up, activity and diet:	Your vitamin D was low therfore you were started on ergocalciferol. Follow-up with your primary care doctor and have your vitamin D levels rechecked in 1 month to determine if you should continue on ergocalciferol or be switched to a different medication such as calciferol or if you no longer need any medications for your vitamin D. Principal Discharge DX:	Cellulitis  Goal:	resolution of infection, pain and swelling  Instructions for follow-up, activity and diet:	Imaging showed skin and soft tissue swelling consistent with cellulitis (skin infection). You were treated with IV antibiotics and these antibiotics have been switched to oral route. Continue to take Augmentin twice a day. The swelling can also be from trauma. You have bruising of your fingers and hands which likely also contributed to your hand swelling. Follow-up with your primary care doctor within te next 5 days to have them reevaluate your hand and make sure that it continues to improve. Take tylenol or tylenol # 3 as prescribed for pain.  Secondary Diagnosis:	Alzheimer disease  Goal:	slow progression of disease  Instructions for follow-up, activity and diet:	Continue your home Namenda as prescribed. Follow-up with your primary care doctor.  Secondary Diagnosis:	DM (diabetes mellitus)  Goal:	to control blood sugar levels  Instructions for follow-up, activity and diet:	You . Continue your home Actos. Do not take metformin today as you had a CT scan with contrast done yesterday (and taking the metformin too soon after the contrast can harm the kidneys). Start taking the metformin tomorrow morning.  Secondary Diagnosis:	HLD (hyperlipidemia)  Goal:	to control cholesterol levels  Instructions for follow-up, activity and diet:	Your cholesterol levels were high and your risk of heart disease is high. You were started on Lipitor. Follow-up on your primary care doctor and have them check your liver tests since you are on valproic acid for seizures as well.  Secondary Diagnosis:	Seizure  Goal:	prevent breakthrough seizures  Instructions for follow-up, activity and diet:	Continue to take your home valproic acid. Follow-up with your primary care doctor  Secondary Diagnosis:	HTN (hypertension)  Goal:	to control blood pressure  Instructions for follow-up, activity and diet:	Your blood pressure was high in the hospital. We increased your Norvasc from 5mg each day to 10mg each day. Follow-up with your primary care doctor and have them readjust your blood pressure medication as needed.  Secondary Diagnosis:	Vitamin D deficiency  Goal:	to control vitamin D levels and build stronger bones  Instructions for follow-up, activity and diet:	Your vitamin D was low therefore you were started on ergocalciferol. Follow-up with your primary care doctor and have your vitamin D levels rechecked in 1 month to determine level. Once levels more than 30, your doctor may switch Vitamin D to 1000 units daily.

## 2017-02-10 NOTE — DISCHARGE NOTE ADULT - PATIENT PORTAL LINK FT
“You can access the [facility] Patient Portal, offered by [facility], by registering with the following website: [email or web address]”
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
## 2017-02-10 NOTE — DISCHARGE NOTE ADULT - SECONDARY DIAGNOSIS.
Alzheimer disease DM (diabetes mellitus) HLD (hyperlipidemia) Seizure HTN (hypertension) Vitamin D deficiency

## 2017-02-11 VITALS
HEART RATE: 78 BPM | DIASTOLIC BLOOD PRESSURE: 79 MMHG | TEMPERATURE: 99 F | RESPIRATION RATE: 18 BRPM | OXYGEN SATURATION: 98 % | SYSTOLIC BLOOD PRESSURE: 141 MMHG

## 2017-02-11 PROCEDURE — 86235 NUCLEAR ANTIGEN ANTIBODY: CPT

## 2017-02-11 PROCEDURE — 82550 ASSAY OF CK (CPK): CPT

## 2017-02-11 PROCEDURE — 87040 BLOOD CULTURE FOR BACTERIA: CPT

## 2017-02-11 PROCEDURE — 85610 PROTHROMBIN TIME: CPT

## 2017-02-11 PROCEDURE — 82009 KETONE BODYS QUAL: CPT

## 2017-02-11 PROCEDURE — 86225 DNA ANTIBODY NATIVE: CPT

## 2017-02-11 PROCEDURE — 85027 COMPLETE CBC AUTOMATED: CPT

## 2017-02-11 PROCEDURE — 83036 HEMOGLOBIN GLYCOSYLATED A1C: CPT

## 2017-02-11 PROCEDURE — 86038 ANTINUCLEAR ANTIBODIES: CPT

## 2017-02-11 PROCEDURE — 73110 X-RAY EXAM OF WRIST: CPT

## 2017-02-11 PROCEDURE — 86036 ANCA SCREEN EACH ANTIBODY: CPT

## 2017-02-11 PROCEDURE — 83735 ASSAY OF MAGNESIUM: CPT

## 2017-02-11 PROCEDURE — 86140 C-REACTIVE PROTEIN: CPT

## 2017-02-11 PROCEDURE — 73201 CT UPPER EXTREMITY W/DYE: CPT

## 2017-02-11 PROCEDURE — 92610 EVALUATE SWALLOWING FUNCTION: CPT

## 2017-02-11 PROCEDURE — 99285 EMERGENCY DEPT VISIT HI MDM: CPT | Mod: 25

## 2017-02-11 PROCEDURE — 83605 ASSAY OF LACTIC ACID: CPT

## 2017-02-11 PROCEDURE — 82746 ASSAY OF FOLIC ACID SERUM: CPT

## 2017-02-11 PROCEDURE — 96374 THER/PROPH/DIAG INJ IV PUSH: CPT

## 2017-02-11 PROCEDURE — 82306 VITAMIN D 25 HYDROXY: CPT

## 2017-02-11 PROCEDURE — 73130 X-RAY EXAM OF HAND: CPT

## 2017-02-11 PROCEDURE — 86200 CCP ANTIBODY: CPT

## 2017-02-11 PROCEDURE — 71045 X-RAY EXAM CHEST 1 VIEW: CPT

## 2017-02-11 PROCEDURE — 80053 COMPREHEN METABOLIC PANEL: CPT

## 2017-02-11 PROCEDURE — 85652 RBC SED RATE AUTOMATED: CPT

## 2017-02-11 PROCEDURE — 80048 BASIC METABOLIC PNL TOTAL CA: CPT

## 2017-02-11 PROCEDURE — 93005 ELECTROCARDIOGRAM TRACING: CPT

## 2017-02-11 PROCEDURE — 80061 LIPID PANEL: CPT

## 2017-02-11 PROCEDURE — 96375 TX/PRO/DX INJ NEW DRUG ADDON: CPT

## 2017-02-11 PROCEDURE — 82607 VITAMIN B-12: CPT

## 2017-02-11 PROCEDURE — 84443 ASSAY THYROID STIM HORMONE: CPT

## 2017-02-11 PROCEDURE — 85730 THROMBOPLASTIN TIME PARTIAL: CPT

## 2017-02-11 PROCEDURE — 84100 ASSAY OF PHOSPHORUS: CPT

## 2017-02-11 RX ADMIN — PANTOPRAZOLE SODIUM 40 MILLIGRAM(S): 20 TABLET, DELAYED RELEASE ORAL at 05:50

## 2017-02-11 RX ADMIN — Medication 1 TABLET(S): at 12:00

## 2017-02-11 RX ADMIN — DIVALPROEX SODIUM 500 MILLIGRAM(S): 500 TABLET, DELAYED RELEASE ORAL at 05:50

## 2017-02-11 RX ADMIN — Medication 1 TABLET(S): at 05:50

## 2017-02-11 RX ADMIN — Medication: at 12:15

## 2017-02-11 RX ADMIN — ENOXAPARIN SODIUM 40 MILLIGRAM(S): 100 INJECTION SUBCUTANEOUS at 12:03

## 2017-02-11 RX ADMIN — PIOGLITAZONE HYDROCHLORIDE 15 MILLIGRAM(S): 15 TABLET ORAL at 11:59

## 2017-02-11 RX ADMIN — Medication 81 MILLIGRAM(S): at 12:00

## 2017-02-11 RX ADMIN — MEMANTINE HYDROCHLORIDE 10 MILLIGRAM(S): 10 TABLET ORAL at 05:50

## 2017-02-11 RX ADMIN — AMLODIPINE BESYLATE 5 MILLIGRAM(S): 2.5 TABLET ORAL at 05:50

## 2017-02-12 LAB
CULTURE RESULTS: SIGNIFICANT CHANGE UP
CULTURE RESULTS: SIGNIFICANT CHANGE UP
SPECIMEN SOURCE: SIGNIFICANT CHANGE UP
SPECIMEN SOURCE: SIGNIFICANT CHANGE UP

## 2017-02-15 DIAGNOSIS — E83.42 HYPOMAGNESEMIA: ICD-10-CM

## 2017-02-15 DIAGNOSIS — E55.9 VITAMIN D DEFICIENCY, UNSPECIFIED: ICD-10-CM

## 2017-02-15 DIAGNOSIS — Z74.01 BED CONFINEMENT STATUS: ICD-10-CM

## 2017-02-15 DIAGNOSIS — M19.90 UNSPECIFIED OSTEOARTHRITIS, UNSPECIFIED SITE: ICD-10-CM

## 2017-02-15 DIAGNOSIS — E11.9 TYPE 2 DIABETES MELLITUS WITHOUT COMPLICATIONS: ICD-10-CM

## 2017-02-15 DIAGNOSIS — I10 ESSENTIAL (PRIMARY) HYPERTENSION: ICD-10-CM

## 2017-02-15 DIAGNOSIS — L03.113 CELLULITIS OF RIGHT UPPER LIMB: ICD-10-CM

## 2017-02-15 DIAGNOSIS — Z79.84 LONG TERM (CURRENT) USE OF ORAL HYPOGLYCEMIC DRUGS: ICD-10-CM

## 2017-02-15 DIAGNOSIS — G40.909 EPILEPSY, UNSPECIFIED, NOT INTRACTABLE, WITHOUT STATUS EPILEPTICUS: ICD-10-CM

## 2017-02-15 DIAGNOSIS — G30.9 ALZHEIMER'S DISEASE, UNSPECIFIED: ICD-10-CM

## 2017-02-15 DIAGNOSIS — M83.8 OTHER ADULT OSTEOMALACIA: ICD-10-CM

## 2017-02-15 DIAGNOSIS — Z79.82 LONG TERM (CURRENT) USE OF ASPIRIN: ICD-10-CM

## 2017-02-15 DIAGNOSIS — I77.6 ARTERITIS, UNSPECIFIED: ICD-10-CM

## 2017-02-15 DIAGNOSIS — E78.5 HYPERLIPIDEMIA, UNSPECIFIED: ICD-10-CM

## 2017-02-22 ENCOUNTER — EMERGENCY (EMERGENCY)
Facility: HOSPITAL | Age: 67
LOS: 1 days | Discharge: HOME | End: 2017-02-22
Attending: EMERGENCY MEDICINE
Payer: MEDICARE

## 2017-02-22 VITALS
OXYGEN SATURATION: 98 % | SYSTOLIC BLOOD PRESSURE: 140 MMHG | TEMPERATURE: 98 F | DIASTOLIC BLOOD PRESSURE: 82 MMHG | HEART RATE: 96 BPM | HEIGHT: 62 IN | RESPIRATION RATE: 18 BRPM | WEIGHT: 130.07 LBS

## 2017-02-22 VITALS
TEMPERATURE: 98 F | OXYGEN SATURATION: 98 % | HEART RATE: 87 BPM | RESPIRATION RATE: 18 BRPM | SYSTOLIC BLOOD PRESSURE: 157 MMHG | DIASTOLIC BLOOD PRESSURE: 91 MMHG

## 2017-02-22 LAB
ALBUMIN SERPL ELPH-MCNC: 3.3 G/DL — LOW (ref 3.5–5)
ALP SERPL-CCNC: 79 U/L — SIGNIFICANT CHANGE UP (ref 40–120)
ALT FLD-CCNC: 16 U/L DA — SIGNIFICANT CHANGE UP (ref 10–60)
ANION GAP SERPL CALC-SCNC: 11 MMOL/L — SIGNIFICANT CHANGE UP (ref 5–17)
AST SERPL-CCNC: 12 U/L — SIGNIFICANT CHANGE UP (ref 10–40)
BASOPHILS # BLD AUTO: 0.1 K/UL — SIGNIFICANT CHANGE UP (ref 0–0.2)
BASOPHILS NFR BLD AUTO: 0.8 % — SIGNIFICANT CHANGE UP (ref 0–2)
BILIRUB SERPL-MCNC: 0.3 MG/DL — SIGNIFICANT CHANGE UP (ref 0.2–1.2)
BUN SERPL-MCNC: 21 MG/DL — HIGH (ref 7–18)
CALCIUM SERPL-MCNC: 9 MG/DL — SIGNIFICANT CHANGE UP (ref 8.4–10.5)
CHLORIDE SERPL-SCNC: 103 MMOL/L — SIGNIFICANT CHANGE UP (ref 96–108)
CK SERPL-CCNC: 86 U/L — SIGNIFICANT CHANGE UP (ref 21–215)
CO2 SERPL-SCNC: 25 MMOL/L — SIGNIFICANT CHANGE UP (ref 22–31)
CREAT SERPL-MCNC: 1 MG/DL — SIGNIFICANT CHANGE UP (ref 0.5–1.3)
EOSINOPHIL # BLD AUTO: 0.2 K/UL — SIGNIFICANT CHANGE UP (ref 0–0.5)
EOSINOPHIL NFR BLD AUTO: 1.6 % — SIGNIFICANT CHANGE UP (ref 0–6)
ERYTHROCYTE [SEDIMENTATION RATE] IN BLOOD: 18 MM/HR — SIGNIFICANT CHANGE UP (ref 0–20)
GLUCOSE SERPL-MCNC: 120 MG/DL — HIGH (ref 70–99)
HCT VFR BLD CALC: 41.6 % — SIGNIFICANT CHANGE UP (ref 34.5–45)
HGB BLD-MCNC: 13.8 G/DL — SIGNIFICANT CHANGE UP (ref 11.5–15.5)
LYMPHOCYTES # BLD AUTO: 4.6 K/UL — HIGH (ref 1–3.3)
LYMPHOCYTES # BLD AUTO: 41.1 % — SIGNIFICANT CHANGE UP (ref 13–44)
MCHC RBC-ENTMCNC: 29.7 PG — SIGNIFICANT CHANGE UP (ref 27–34)
MCHC RBC-ENTMCNC: 33.3 GM/DL — SIGNIFICANT CHANGE UP (ref 32–36)
MCV RBC AUTO: 89.1 FL — SIGNIFICANT CHANGE UP (ref 80–100)
MONOCYTES # BLD AUTO: 1.2 K/UL — HIGH (ref 0–0.9)
MONOCYTES NFR BLD AUTO: 10.6 % — SIGNIFICANT CHANGE UP (ref 2–14)
NEUTROPHILS # BLD AUTO: 5.2 K/UL — SIGNIFICANT CHANGE UP (ref 1.8–7.4)
NEUTROPHILS NFR BLD AUTO: 45.8 % — SIGNIFICANT CHANGE UP (ref 43–77)
PLATELET # BLD AUTO: 300 K/UL — SIGNIFICANT CHANGE UP (ref 150–400)
POTASSIUM SERPL-MCNC: 4.2 MMOL/L — SIGNIFICANT CHANGE UP (ref 3.5–5.3)
POTASSIUM SERPL-SCNC: 4.2 MMOL/L — SIGNIFICANT CHANGE UP (ref 3.5–5.3)
PROT SERPL-MCNC: 7.6 G/DL — SIGNIFICANT CHANGE UP (ref 6–8.3)
RBC # BLD: 4.67 M/UL — SIGNIFICANT CHANGE UP (ref 3.8–5.2)
RBC # FLD: 13.9 % — SIGNIFICANT CHANGE UP (ref 10.3–14.5)
SODIUM SERPL-SCNC: 139 MMOL/L — SIGNIFICANT CHANGE UP (ref 135–145)
WBC # BLD: 11.3 K/UL — HIGH (ref 3.8–10.5)
WBC # FLD AUTO: 11.3 K/UL — HIGH (ref 3.8–10.5)

## 2017-02-22 PROCEDURE — 99285 EMERGENCY DEPT VISIT HI MDM: CPT

## 2017-02-22 PROCEDURE — 85027 COMPLETE CBC AUTOMATED: CPT

## 2017-02-22 PROCEDURE — 73130 X-RAY EXAM OF HAND: CPT

## 2017-02-22 PROCEDURE — 80053 COMPREHEN METABOLIC PANEL: CPT

## 2017-02-22 PROCEDURE — 85652 RBC SED RATE AUTOMATED: CPT

## 2017-02-22 PROCEDURE — 99283 EMERGENCY DEPT VISIT LOW MDM: CPT | Mod: 25

## 2017-02-22 PROCEDURE — 82550 ASSAY OF CK (CPK): CPT

## 2017-02-22 PROCEDURE — 73130 X-RAY EXAM OF HAND: CPT | Mod: 26,LT

## 2017-02-22 RX ORDER — SODIUM CHLORIDE 9 MG/ML
1000 INJECTION INTRAMUSCULAR; INTRAVENOUS; SUBCUTANEOUS
Qty: 0 | Refills: 0 | Status: DISCONTINUED | OUTPATIENT
Start: 2017-02-22 | End: 2017-02-27

## 2017-02-22 RX ORDER — IBUPROFEN 200 MG
400 TABLET ORAL ONCE
Qty: 0 | Refills: 0 | Status: COMPLETED | OUTPATIENT
Start: 2017-02-22 | End: 2017-02-22

## 2017-02-22 RX ADMIN — Medication 400 MILLIGRAM(S): at 18:10

## 2017-02-22 RX ADMIN — Medication 400 MILLIGRAM(S): at 17:20

## 2017-02-22 RX ADMIN — SODIUM CHLORIDE 125 MILLILITER(S): 9 INJECTION INTRAMUSCULAR; INTRAVENOUS; SUBCUTANEOUS at 17:45

## 2017-02-22 NOTE — ED PROVIDER NOTE - OBJECTIVE STATEMENT
65 y/o F pt w/ PMHx of HTN, HLD and DM presents to the ED c/o L hand swelling. Pt's daughter states that pt woke up w/ L hand swelling and pain. Pt also woke up w/ b/l eye redness. Denies trauma, fever, chills numbness/tingling, weakness or any other complaints. NKDA.

## 2017-02-22 NOTE — ED PROVIDER NOTE - NS ED MD SCRIBE ATTENDING SCRIBE SECTIONS
DISPOSITION/PHYSICAL EXAM/REVIEW OF SYSTEMS/HIV/VITAL SIGNS( Pullset)/HISTORY OF PRESENT ILLNESS/PAST MEDICAL/SURGICAL/SOCIAL HISTORY

## 2017-02-22 NOTE — ED PROVIDER NOTE - MUSCULOSKELETAL, MLM
Spine appears normal, range of motion is not limited, Lt hand- 2nd-3rd MVP tenderness to palp, swelling, volar aspect Lt MCP hematoma seen, with swelling Lt index finger, no erythema, no fluctuant
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
## 2017-02-22 NOTE — ED ADULT NURSE NOTE - ED STAT RN HANDOFF DETAILS
Report given to TIMBO Galeano RN. Patient sitting on chair. In no distress. IV fluids NS ongoing. IV heplock patent. Daughter and aide at side.

## 2017-02-22 NOTE — ED ADULT NURSE NOTE - OBJECTIVE STATEMENT
Presented to ED complaining of left hand swelling that happened today. Awake. Breathing on room air. Complaints of pain to left hand. Capillary refill time<3 secs. Denies fever and trauma.

## 2017-02-27 DIAGNOSIS — Z79.84 LONG TERM (CURRENT) USE OF ORAL HYPOGLYCEMIC DRUGS: ICD-10-CM

## 2017-02-27 DIAGNOSIS — E78.00 PURE HYPERCHOLESTEROLEMIA, UNSPECIFIED: ICD-10-CM

## 2017-02-27 DIAGNOSIS — E11.9 TYPE 2 DIABETES MELLITUS WITHOUT COMPLICATIONS: ICD-10-CM

## 2017-02-27 DIAGNOSIS — M79.642 PAIN IN LEFT HAND: ICD-10-CM

## 2017-02-27 DIAGNOSIS — F02.80 DEMENTIA IN OTHER DISEASES CLASSIFIED ELSEWHERE, UNSPECIFIED SEVERITY, WITHOUT BEHAVIORAL DISTURBANCE, PSYCHOTIC DISTURBANCE, MOOD DISTURBANCE, AND ANXIETY: ICD-10-CM

## 2017-02-27 DIAGNOSIS — G30.9 ALZHEIMER'S DISEASE, UNSPECIFIED: ICD-10-CM

## 2017-02-27 DIAGNOSIS — Z79.82 LONG TERM (CURRENT) USE OF ASPIRIN: ICD-10-CM

## 2018-03-31 NOTE — ED ADULT NURSE NOTE - CHIEF COMPLAINT
Problem: Pressure Injury, Risk for  Goal: # Skin remains intact  Outcome: Outcome Met, Continue evaluating goal progress toward completion  Patient turned q 2 hours with total assist 2 RNs. TAP device and wedges used for offloading.    Problem: Impaired Physical Mobility  Goal: # Bed mobility, ambulation, and ADLs are maintained or returned to  baseline during hospitalization  Outcome: Outcome Not Met, Continue to Monitor  Patient blood pressure unstable, requiring vasopressors and transfusions. Sedated at this time. Unable to consult PT/OT, will reconsider when stable.    Problem: Pain  Goal: Acceptable pain/comfort level is achieved/maintained at rest (based on Pain Behaviors Scale)  This goal is used for patients who are not able to self-report pain and are assessed for pain using the Pain Behaviors Scale   Outcome: Outcome Met, Continue evaluating goal progress toward completion  Patient shakes head no to pain on fentanyl drip unless manipulating abdomen or turning patient. Will continue drip and monitor pain behaviors.       The patient is a 66y Female complaining of

## 2018-06-19 ENCOUNTER — EMERGENCY (EMERGENCY)
Facility: HOSPITAL | Age: 68
LOS: 1 days | Discharge: HOME | End: 2018-06-19
Attending: EMERGENCY MEDICINE
Payer: MEDICARE

## 2018-06-19 VITALS
RESPIRATION RATE: 20 BRPM | DIASTOLIC BLOOD PRESSURE: 96 MMHG | SYSTOLIC BLOOD PRESSURE: 148 MMHG | HEIGHT: 65 IN | WEIGHT: 130.07 LBS | HEART RATE: 74 BPM

## 2018-06-19 VITALS
RESPIRATION RATE: 17 BRPM | SYSTOLIC BLOOD PRESSURE: 129 MMHG | HEART RATE: 69 BPM | OXYGEN SATURATION: 99 % | DIASTOLIC BLOOD PRESSURE: 73 MMHG

## 2018-06-19 LAB
ALBUMIN SERPL ELPH-MCNC: 2.7 G/DL — LOW (ref 3.5–5)
ALP SERPL-CCNC: 72 U/L — SIGNIFICANT CHANGE UP (ref 40–120)
ALT FLD-CCNC: 11 U/L DA — SIGNIFICANT CHANGE UP (ref 10–60)
ANION GAP SERPL CALC-SCNC: 7 MMOL/L — SIGNIFICANT CHANGE UP (ref 5–17)
APPEARANCE UR: CLEAR — SIGNIFICANT CHANGE UP
AST SERPL-CCNC: 14 U/L — SIGNIFICANT CHANGE UP (ref 10–40)
BACTERIA # UR AUTO: ABNORMAL /HPF
BASOPHILS # BLD AUTO: 0.1 K/UL — SIGNIFICANT CHANGE UP (ref 0–0.2)
BASOPHILS NFR BLD AUTO: 1.2 % — SIGNIFICANT CHANGE UP (ref 0–2)
BILIRUB SERPL-MCNC: 0.2 MG/DL — SIGNIFICANT CHANGE UP (ref 0.2–1.2)
BILIRUB UR-MCNC: NEGATIVE — SIGNIFICANT CHANGE UP
BUN SERPL-MCNC: 10 MG/DL — SIGNIFICANT CHANGE UP (ref 7–18)
CALCIUM SERPL-MCNC: 9.3 MG/DL — SIGNIFICANT CHANGE UP (ref 8.4–10.5)
CHLORIDE SERPL-SCNC: 105 MMOL/L — SIGNIFICANT CHANGE UP (ref 96–108)
CO2 SERPL-SCNC: 28 MMOL/L — SIGNIFICANT CHANGE UP (ref 22–31)
COLOR SPEC: YELLOW — SIGNIFICANT CHANGE UP
CREAT SERPL-MCNC: 0.78 MG/DL — SIGNIFICANT CHANGE UP (ref 0.5–1.3)
DIFF PNL FLD: NEGATIVE — SIGNIFICANT CHANGE UP
EOSINOPHIL # BLD AUTO: 0.2 K/UL — SIGNIFICANT CHANGE UP (ref 0–0.5)
EOSINOPHIL NFR BLD AUTO: 1.5 % — SIGNIFICANT CHANGE UP (ref 0–6)
EPI CELLS # UR: SIGNIFICANT CHANGE UP /HPF
GLUCOSE SERPL-MCNC: 88 MG/DL — SIGNIFICANT CHANGE UP (ref 70–99)
GLUCOSE UR QL: NEGATIVE — SIGNIFICANT CHANGE UP
HCT VFR BLD CALC: 46 % — HIGH (ref 34.5–45)
HGB BLD-MCNC: 14.5 G/DL — SIGNIFICANT CHANGE UP (ref 11.5–15.5)
KETONES UR-MCNC: NEGATIVE — SIGNIFICANT CHANGE UP
LEUKOCYTE ESTERASE UR-ACNC: NEGATIVE — SIGNIFICANT CHANGE UP
LYMPHOCYTES # BLD AUTO: 38.6 % — SIGNIFICANT CHANGE UP (ref 13–44)
LYMPHOCYTES # BLD AUTO: 4.1 K/UL — HIGH (ref 1–3.3)
MCHC RBC-ENTMCNC: 30.3 PG — SIGNIFICANT CHANGE UP (ref 27–34)
MCHC RBC-ENTMCNC: 31.6 GM/DL — LOW (ref 32–36)
MCV RBC AUTO: 96 FL — SIGNIFICANT CHANGE UP (ref 80–100)
MONOCYTES # BLD AUTO: 1.1 K/UL — HIGH (ref 0–0.9)
MONOCYTES NFR BLD AUTO: 10.2 % — SIGNIFICANT CHANGE UP (ref 2–14)
NEUTROPHILS # BLD AUTO: 5.2 K/UL — SIGNIFICANT CHANGE UP (ref 1.8–7.4)
NEUTROPHILS NFR BLD AUTO: 48.5 % — SIGNIFICANT CHANGE UP (ref 43–77)
NITRITE UR-MCNC: NEGATIVE — SIGNIFICANT CHANGE UP
PH UR: 7 — SIGNIFICANT CHANGE UP (ref 5–8)
PLATELET # BLD AUTO: 218 K/UL — SIGNIFICANT CHANGE UP (ref 150–400)
POTASSIUM SERPL-MCNC: 4.8 MMOL/L — SIGNIFICANT CHANGE UP (ref 3.5–5.3)
POTASSIUM SERPL-SCNC: 4.8 MMOL/L — SIGNIFICANT CHANGE UP (ref 3.5–5.3)
PROT SERPL-MCNC: 7.4 G/DL — SIGNIFICANT CHANGE UP (ref 6–8.3)
PROT UR-MCNC: NEGATIVE — SIGNIFICANT CHANGE UP
RBC # BLD: 4.8 M/UL — SIGNIFICANT CHANGE UP (ref 3.8–5.2)
RBC # FLD: 13.2 % — SIGNIFICANT CHANGE UP (ref 10.3–14.5)
RBC CASTS # UR COMP ASSIST: SIGNIFICANT CHANGE UP /HPF (ref 0–2)
SODIUM SERPL-SCNC: 140 MMOL/L — SIGNIFICANT CHANGE UP (ref 135–145)
SP GR SPEC: 1.01 — SIGNIFICANT CHANGE UP (ref 1.01–1.02)
UROBILINOGEN FLD QL: NEGATIVE — SIGNIFICANT CHANGE UP
WBC # BLD: 10.7 K/UL — HIGH (ref 3.8–10.5)
WBC # FLD AUTO: 10.7 K/UL — HIGH (ref 3.8–10.5)
WBC UR QL: SIGNIFICANT CHANGE UP /HPF (ref 0–5)

## 2018-06-19 PROCEDURE — 99284 EMERGENCY DEPT VISIT MOD MDM: CPT | Mod: 25

## 2018-06-19 PROCEDURE — 71045 X-RAY EXAM CHEST 1 VIEW: CPT | Mod: 26

## 2018-06-19 PROCEDURE — 87086 URINE CULTURE/COLONY COUNT: CPT

## 2018-06-19 PROCEDURE — 70450 CT HEAD/BRAIN W/O DYE: CPT

## 2018-06-19 PROCEDURE — 99285 EMERGENCY DEPT VISIT HI MDM: CPT

## 2018-06-19 PROCEDURE — 70450 CT HEAD/BRAIN W/O DYE: CPT | Mod: 26

## 2018-06-19 PROCEDURE — 74176 CT ABD & PELVIS W/O CONTRAST: CPT

## 2018-06-19 PROCEDURE — 85027 COMPLETE CBC AUTOMATED: CPT

## 2018-06-19 PROCEDURE — 71045 X-RAY EXAM CHEST 1 VIEW: CPT

## 2018-06-19 PROCEDURE — 74176 CT ABD & PELVIS W/O CONTRAST: CPT | Mod: 26

## 2018-06-19 PROCEDURE — 93005 ELECTROCARDIOGRAM TRACING: CPT

## 2018-06-19 PROCEDURE — 80053 COMPREHEN METABOLIC PANEL: CPT

## 2018-06-19 PROCEDURE — 81001 URINALYSIS AUTO W/SCOPE: CPT

## 2018-06-19 NOTE — ED PROVIDER NOTE - OBJECTIVE STATEMENT
66 y/o F pt with PMHx of Alzheimer --- no ambulation at baseline, nonverbal but interactive in terms of eating and smiling --- BIB family for decreased eating and decreased smiling x few days. Aspiration episodes are reported as well. Family states that pt dos not appear in distress but is not acting right.

## 2018-06-19 NOTE — ED PROVIDER NOTE - CARE PLAN
Principal Discharge DX:	Late onset Alzheimer's disease without behavioral disturbance  Goal:	advancing.

## 2018-06-19 NOTE — ED PROVIDER NOTE - MEDICAL DECISION MAKING DETAILS
Unclear cause of acute deterioration --- perhaps ICH; perhaps infections.  Will screen for infection. Will check CT abd due to tenderness. Will CT head due to mental status.

## 2018-06-19 NOTE — ED PROVIDER NOTE - PROGRESS NOTE DETAILS
w/u essentially neg. offered admit for further eval. pt daughter states would prefer limited intervention / NO admission due to advance alzheimer's. has md that comes to house who can see pt promptly.

## 2018-06-20 LAB
CULTURE RESULTS: NO GROWTH — SIGNIFICANT CHANGE UP
SPECIMEN SOURCE: SIGNIFICANT CHANGE UP

## 2019-01-03 NOTE — ED ADULT NURSE NOTE - CCCP TRG CHIEF CMPLNT
Transfer  Transferred to: 4 A ICU  Via:bed  Reason for transfer: Pt inappropriate for 6C- worsening patient condition  Family: Aware of transfer  Belongings: Sent with pt  Chart: Sent with pt  Medications: Med's from bin sent with pt  Report called to:  Justen PULIDO RN    Pt admitted 1/1 hypoxic respiratory failure, JUWAN, and supra therapeutic INR.  ST with BBB, tachypnea at times on 4-6 L Oxymask or BiPAP.  Pt unable to tolerate BiPAP and got transferred down to be vented r/t low ph.  100 mg of Lasix given with little UOP.  4 mg IV Bumex push and followed with 0.5 mg/hr (2 ml/hr) Bumex gtt.  On two liter fluid restriction.  Otherwise, pt up assist of one and bed alarm on.  Continue to monitor and with POC.         difficulty speaking

## 2020-06-20 NOTE — ED PROVIDER NOTE - ENMT, MLM
Go for blood tests as directed. Your doctor will do lab tests at regular visits to monitor the effects of this medicine. Please follow up with your doctor and keep your health care provider appointments. Airway patent, Nasal mucosa clear. Mouth with normal mucosa. Throat has no vesicles, no oropharyngeal exudates and uvula is midline.

## 2020-11-12 NOTE — ED ADULT NURSE NOTE - ED STAT RN HANDOFF TIME
DOS: 11-.  Patient informed of current hospital visitor policy due to COVID-19: inpatient procedures may  have 1 designated person accompany them for procedure/surgery until transported to the procedure/surgery room and then the visitor must leave. No visitors currently allowed during hospitalization. No  parking available.    Patient was instructed to take the following medications the day of surgery: bupropion, escitalopram and alprazalam PRN.    Testing needed Day of Surgery.       19:00

## 2021-10-13 NOTE — ED ADULT NURSE NOTE - NS ED PATIENT SAFETY CONCERN
Hide Additional Notes?: No Additional Note: Benign in nature Include Location In Plan?: Yes No Detail Level: Simple

## 2021-11-15 NOTE — H&P ADULT. - PRO ANTICIPATED DISCH DISP
Spoke with Luiz Hu home w/ assist/home w/ home health

## 2021-12-21 NOTE — ED PROVIDER NOTE - PROGRESS NOTE DETAILS
Spoke to pt over the phone who self-tested today.  Pt denies medication or bleeding issues.  Pt will resume previous coumadin dose at which she was usually therapeutic.  Instructed pt to self-test again on Wed, Jan 5th.  Instructions verbalized.  Findings reported by Baron Herrera RN.   Today's INR is Lab Results - Last 18 Months   Lab Units 12/21/21  0000   INR  2.00          
pt's daughter claims pt had similar presentation last time when she was admitted with Rt hand cellulitis, labs- neg, explain to daughter if develop redness, to give Augmentin, & f/u with PMD

## 2025-04-25 NOTE — ED ADULT NURSE NOTE - SKIN INTEGRITY
[FreeTextEntry1] : Humera Overton is a 77 year old woman with history of HTN, HLD, DM II, dizziness. They are presenting to establish CV care after prior cardiologist retired.   History of Present Illness: The patient reports persistent dizziness since 2024, which has not resolved. They describe the sensation as vertigo, feeling as though the room is spinning. In 2024, Dr. Hughes adjusted the patient's blood pressure medication, but no other interventions were noted. The patient's A1C was 7.7 in 2025, and cholesterol levels were good, with a total cholesterol of 164 and LDL of 89. Current medications include Lipitor and synthroid. The patient is advised to see an ENT specialist for further evaluation of vertigo. Repeat blood work is suggested to reassess triglycerides before making medication adjustments.  Cardiovascular Review of Systems: - Reports not having chest pain - Reports not having shortness of breath - Reports not having palpitations  - Reports not having orthopnea or PND - Reports not having dyspnea on exertion  Cardiovascular Focused Exam: - Vitals reviewed - Gen: NAD - HEENT: NCAT, EOMI - Cardiac: Regular rate and rhythm. No murmurs, rubs, gallops. Normal S1, S2. - Pulm: Clear to auscultation bilaterally, no wheezing. - Ext: Warm, well-perfused extremities. Radial pulses bilaterally 2+.  Recent Cardiovascular Testin25 - ECG - Normal heart rate, no evidence of previous heart attack 21 - TTE - Ejection fraction 65-60%, valves functioning normally 
purple discoloration left hand